# Patient Record
Sex: MALE | Race: WHITE | HISPANIC OR LATINO
[De-identification: names, ages, dates, MRNs, and addresses within clinical notes are randomized per-mention and may not be internally consistent; named-entity substitution may affect disease eponyms.]

---

## 2017-04-26 ENCOUNTER — NON-APPOINTMENT (OUTPATIENT)
Age: 67
End: 2017-04-26

## 2017-04-26 ENCOUNTER — APPOINTMENT (OUTPATIENT)
Dept: CARDIOLOGY | Facility: CLINIC | Age: 67
End: 2017-04-26

## 2017-04-26 VITALS — HEART RATE: 53 BPM | DIASTOLIC BLOOD PRESSURE: 70 MMHG | SYSTOLIC BLOOD PRESSURE: 126 MMHG | OXYGEN SATURATION: 97 %

## 2017-04-26 VITALS — HEART RATE: 53 BPM | SYSTOLIC BLOOD PRESSURE: 116 MMHG | DIASTOLIC BLOOD PRESSURE: 65 MMHG | OXYGEN SATURATION: 97 %

## 2017-04-26 VITALS
BODY MASS INDEX: 27.47 KG/M2 | HEIGHT: 63.5 IN | WEIGHT: 157 LBS | DIASTOLIC BLOOD PRESSURE: 68 MMHG | OXYGEN SATURATION: 97 % | RESPIRATION RATE: 12 BRPM | HEART RATE: 52 BPM | SYSTOLIC BLOOD PRESSURE: 121 MMHG

## 2017-04-26 VITALS — HEART RATE: 56 BPM | DIASTOLIC BLOOD PRESSURE: 69 MMHG | SYSTOLIC BLOOD PRESSURE: 121 MMHG | OXYGEN SATURATION: 96 %

## 2017-04-27 LAB
25(OH)D3 SERPL-MCNC: 36.3 NG/ML
ALBUMIN SERPL ELPH-MCNC: 4.5 G/DL
ALP BLD-CCNC: 57 U/L
ALT SERPL-CCNC: 19 U/L
ANION GAP SERPL CALC-SCNC: 17 MMOL/L
APPEARANCE: CLEAR
AST SERPL-CCNC: 30 U/L
BACTERIA: NEGATIVE
BASOPHILS # BLD AUTO: 0.09 K/UL
BASOPHILS NFR BLD AUTO: 0.9 %
BILIRUB SERPL-MCNC: 0.7 MG/DL
BILIRUBIN URINE: NEGATIVE
BLOOD URINE: NEGATIVE
BUN SERPL-MCNC: 13 MG/DL
CALCIUM SERPL-MCNC: 9.7 MG/DL
CHLORIDE SERPL-SCNC: 94 MMOL/L
CHOLEST SERPL-MCNC: 210 MG/DL
CHOLEST/HDLC SERPL: 2.5 RATIO
CO2 SERPL-SCNC: 29 MMOL/L
COLOR: YELLOW
CREAT SERPL-MCNC: 0.82 MG/DL
CREAT SPEC-SCNC: 74 MG/DL
CRP SERPL HS-MCNC: 1.8 MG/L
EOSINOPHIL # BLD AUTO: 0.17 K/UL
EOSINOPHIL NFR BLD AUTO: 1.7 %
GLUCOSE QUALITATIVE U: NORMAL MG/DL
GLUCOSE SERPL-MCNC: 60 MG/DL
HBA1C MFR BLD HPLC: 5.3 %
HCT VFR BLD CALC: 48.3 %
HDLC SERPL-MCNC: 84 MG/DL
HGB BLD-MCNC: 16.2 G/DL
HYALINE CASTS: 4 /LPF
IMM GRANULOCYTES NFR BLD AUTO: 0.3 %
KETONES URINE: NEGATIVE
LDLC SERPL CALC-MCNC: 103 MG/DL
LEUKOCYTE ESTERASE URINE: NEGATIVE
LYMPHOCYTES # BLD AUTO: 1.86 K/UL
LYMPHOCYTES NFR BLD AUTO: 18.6 %
MAN DIFF?: NORMAL
MCHC RBC-ENTMCNC: 33.5 GM/DL
MCHC RBC-ENTMCNC: 34.7 PG
MCV RBC AUTO: 103.4 FL
MICROALBUMIN 24H UR DL<=1MG/L-MCNC: 0.7 MG/DL
MICROALBUMIN/CREAT 24H UR-RTO: 10 UG/MG
MICROSCOPIC-UA: NORMAL
MONOCYTES # BLD AUTO: 1.43 K/UL
MONOCYTES NFR BLD AUTO: 14.3 %
NEUTROPHILS # BLD AUTO: 6.42 K/UL
NEUTROPHILS NFR BLD AUTO: 64.2 %
NITRITE URINE: NEGATIVE
PH URINE: 7.5
PLATELET # BLD AUTO: 483 K/UL
POTASSIUM SERPL-SCNC: 3.7 MMOL/L
PROT SERPL-MCNC: 7.4 G/DL
PROTEIN URINE: NEGATIVE MG/DL
RBC # BLD: 4.67 M/UL
RBC # FLD: 14 %
RED BLOOD CELLS URINE: 1 /HPF
SODIUM SERPL-SCNC: 140 MMOL/L
SPECIFIC GRAVITY URINE: 1.01
SQUAMOUS EPITHELIAL CELLS: 1 /HPF
TRIGL SERPL-MCNC: 117 MG/DL
TSH SERPL-ACNC: 0.78 UIU/ML
UROBILINOGEN URINE: NORMAL MG/DL
WBC # FLD AUTO: 10 K/UL
WHITE BLOOD CELLS URINE: 0 /HPF

## 2017-10-26 ENCOUNTER — RX RENEWAL (OUTPATIENT)
Age: 67
End: 2017-10-26

## 2017-12-05 ENCOUNTER — APPOINTMENT (OUTPATIENT)
Dept: CARDIOLOGY | Facility: CLINIC | Age: 67
End: 2017-12-05
Payer: COMMERCIAL

## 2017-12-05 VITALS
HEART RATE: 62 BPM | SYSTOLIC BLOOD PRESSURE: 116 MMHG | RESPIRATION RATE: 12 BRPM | BODY MASS INDEX: 26.77 KG/M2 | HEIGHT: 63.5 IN | OXYGEN SATURATION: 98 % | DIASTOLIC BLOOD PRESSURE: 64 MMHG | WEIGHT: 153 LBS

## 2017-12-05 VITALS — DIASTOLIC BLOOD PRESSURE: 61 MMHG | SYSTOLIC BLOOD PRESSURE: 106 MMHG | HEART RATE: 63 BPM | OXYGEN SATURATION: 97 %

## 2017-12-05 VITALS — DIASTOLIC BLOOD PRESSURE: 65 MMHG | HEART RATE: 67 BPM | SYSTOLIC BLOOD PRESSURE: 124 MMHG | OXYGEN SATURATION: 96 %

## 2017-12-05 VITALS — HEART RATE: 62 BPM | OXYGEN SATURATION: 95 % | DIASTOLIC BLOOD PRESSURE: 59 MMHG | SYSTOLIC BLOOD PRESSURE: 105 MMHG

## 2017-12-05 VITALS — HEART RATE: 65 BPM | SYSTOLIC BLOOD PRESSURE: 96 MMHG | DIASTOLIC BLOOD PRESSURE: 55 MMHG | OXYGEN SATURATION: 95 %

## 2017-12-05 PROCEDURE — 93040 RHYTHM ECG WITH REPORT: CPT | Mod: 59

## 2017-12-05 PROCEDURE — 93000 ELECTROCARDIOGRAM COMPLETE: CPT

## 2017-12-05 PROCEDURE — 99215 OFFICE O/P EST HI 40 MIN: CPT

## 2017-12-05 RX ORDER — HYDROCODONE BITARTRATE AND ACETAMINOPHEN 7.5; 325 MG/1; MG/1
7.5-325 TABLET ORAL
Qty: 120 | Refills: 0 | Status: DISCONTINUED | COMMUNITY
Start: 2017-09-15 | End: 2017-12-05

## 2017-12-05 RX ORDER — PREGABALIN 25 MG/1
25 CAPSULE ORAL
Qty: 90 | Refills: 0 | Status: DISCONTINUED | COMMUNITY
Start: 2017-11-03 | End: 2017-12-05

## 2017-12-05 RX ORDER — BRIMONIDINE TARTRATE 2 MG/MG
0.2 SOLUTION/ DROPS OPHTHALMIC
Qty: 15 | Refills: 0 | Status: DISCONTINUED | COMMUNITY
Start: 2017-11-27 | End: 2017-12-05

## 2017-12-05 RX ORDER — CLONIDINE 0.2 MG/24H
0.2 PATCH, EXTENDED RELEASE TRANSDERMAL
Qty: 8 | Refills: 0 | Status: DISCONTINUED | COMMUNITY
Start: 2017-03-06 | End: 2017-12-05

## 2017-12-05 RX ORDER — BRIMONIDINE TARTRATE, TIMOLOL MALEATE 2; 5 MG/ML; MG/ML
0.2-0.5 SOLUTION/ DROPS OPHTHALMIC
Qty: 10 | Refills: 0 | Status: DISCONTINUED | COMMUNITY
Start: 2017-05-18 | End: 2017-12-05

## 2017-12-05 RX ORDER — LEVOFLOXACIN 500 MG/1
500 TABLET, FILM COATED ORAL
Qty: 4 | Refills: 0 | Status: DISCONTINUED | COMMUNITY
Start: 2017-08-14 | End: 2017-12-05

## 2017-12-05 RX ORDER — ESOMEPRAZOLE MAGNESIUM 40 MG/1
40 CAPSULE, DELAYED RELEASE ORAL
Qty: 30 | Refills: 0 | Status: DISCONTINUED | COMMUNITY
Start: 2017-05-26 | End: 2017-12-05

## 2017-12-05 RX ORDER — NALOXEGOL OXALATE 25 MG/1
25 TABLET, FILM COATED ORAL
Qty: 30 | Refills: 0 | Status: DISCONTINUED | COMMUNITY
Start: 2017-06-16 | End: 2017-12-05

## 2018-01-16 ENCOUNTER — RX RENEWAL (OUTPATIENT)
Age: 68
End: 2018-01-16

## 2018-05-23 ENCOUNTER — NON-APPOINTMENT (OUTPATIENT)
Age: 68
End: 2018-05-23

## 2018-05-23 ENCOUNTER — APPOINTMENT (OUTPATIENT)
Dept: CARDIOLOGY | Facility: CLINIC | Age: 68
End: 2018-05-23
Payer: COMMERCIAL

## 2018-05-23 VITALS
OXYGEN SATURATION: 96 % | RESPIRATION RATE: 12 BRPM | HEIGHT: 63 IN | BODY MASS INDEX: 28.88 KG/M2 | HEART RATE: 67 BPM | SYSTOLIC BLOOD PRESSURE: 126 MMHG | WEIGHT: 163 LBS | DIASTOLIC BLOOD PRESSURE: 69 MMHG

## 2018-05-23 VITALS — HEART RATE: 69 BPM | OXYGEN SATURATION: 94 % | SYSTOLIC BLOOD PRESSURE: 131 MMHG | DIASTOLIC BLOOD PRESSURE: 71 MMHG

## 2018-05-23 VITALS — DIASTOLIC BLOOD PRESSURE: 61 MMHG | SYSTOLIC BLOOD PRESSURE: 107 MMHG | OXYGEN SATURATION: 97 % | HEART RATE: 77 BPM

## 2018-05-23 VITALS — OXYGEN SATURATION: 97 % | SYSTOLIC BLOOD PRESSURE: 115 MMHG | DIASTOLIC BLOOD PRESSURE: 65 MMHG | HEART RATE: 78 BPM

## 2018-05-23 VITALS — HEART RATE: 69 BPM | OXYGEN SATURATION: 97 % | DIASTOLIC BLOOD PRESSURE: 69 MMHG | SYSTOLIC BLOOD PRESSURE: 117 MMHG

## 2018-05-23 VITALS — HEART RATE: 76 BPM | SYSTOLIC BLOOD PRESSURE: 114 MMHG | DIASTOLIC BLOOD PRESSURE: 67 MMHG | OXYGEN SATURATION: 96 %

## 2018-05-23 DIAGNOSIS — D50.0 IRON DEFICIENCY ANEMIA SECONDARY TO BLOOD LOSS (CHRONIC): ICD-10-CM

## 2018-05-23 DIAGNOSIS — R09.89 OTHER SPECIFIED SYMPTOMS AND SIGNS INVOLVING THE CIRCULATORY AND RESPIRATORY SYSTEMS: ICD-10-CM

## 2018-05-23 PROCEDURE — 99215 OFFICE O/P EST HI 40 MIN: CPT

## 2018-05-23 PROCEDURE — 36415 COLL VENOUS BLD VENIPUNCTURE: CPT

## 2018-05-23 PROCEDURE — 93040 RHYTHM ECG WITH REPORT: CPT | Mod: 59

## 2018-05-23 PROCEDURE — 93000 ELECTROCARDIOGRAM COMPLETE: CPT

## 2018-05-23 RX ORDER — CLONIDINE HYDROCHLORIDE 0.2 MG/1
0.2 TABLET ORAL
Qty: 60 | Refills: 0 | Status: DISCONTINUED | COMMUNITY
Start: 2017-10-10 | End: 2018-05-23

## 2018-05-23 RX ORDER — FENTANYL 50 UG/1
50 PATCH TRANSDERMAL
Qty: 10 | Refills: 0 | Status: DISCONTINUED | COMMUNITY
Start: 2017-11-15 | End: 2018-05-23

## 2018-05-23 RX ORDER — CYANOCOBALAMIN 1000 UG/ML
1000 INJECTION INTRAMUSCULAR; SUBCUTANEOUS
Qty: 12 | Refills: 0 | Status: DISCONTINUED | COMMUNITY
Start: 2016-11-01 | End: 2018-05-23

## 2018-05-23 RX ORDER — TEMAZEPAM 30 MG/1
30 CAPSULE ORAL
Qty: 60 | Refills: 0 | Status: DISCONTINUED | COMMUNITY
Start: 2017-10-10 | End: 2018-05-23

## 2018-05-24 LAB
25(OH)D3 SERPL-MCNC: 47.3 NG/ML
ALBUMIN SERPL ELPH-MCNC: 4.5 G/DL
ALP BLD-CCNC: 69 U/L
ALT SERPL-CCNC: 26 U/L
ANION GAP SERPL CALC-SCNC: 22 MMOL/L
APPEARANCE: CLEAR
AST SERPL-CCNC: 34 U/L
BACTERIA: NEGATIVE
BASOPHILS # BLD AUTO: 0.04 K/UL
BASOPHILS NFR BLD AUTO: 0.4 %
BILIRUB SERPL-MCNC: 0.4 MG/DL
BILIRUBIN URINE: NEGATIVE
BLOOD URINE: NEGATIVE
BUN SERPL-MCNC: 17 MG/DL
CALCIUM SERPL-MCNC: 9.8 MG/DL
CHLORIDE SERPL-SCNC: 97 MMOL/L
CHOLEST SERPL-MCNC: 200 MG/DL
CHOLEST/HDLC SERPL: 3.3 RATIO
CO2 SERPL-SCNC: 25 MMOL/L
COLOR: ABNORMAL
CREAT SERPL-MCNC: 1.06 MG/DL
CREAT SPEC-SCNC: 241 MG/DL
EOSINOPHIL # BLD AUTO: 0.19 K/UL
EOSINOPHIL NFR BLD AUTO: 2 %
GLUCOSE QUALITATIVE U: NEGATIVE MG/DL
GLUCOSE SERPL-MCNC: 49 MG/DL
HBA1C MFR BLD HPLC: 5 %
HCT VFR BLD CALC: 40.2 %
HDLC SERPL-MCNC: 60 MG/DL
HGB BLD-MCNC: 12.8 G/DL
HYALINE CASTS: 0 /LPF
IMM GRANULOCYTES NFR BLD AUTO: 0.2 %
IRON SATN MFR SERPL: 71 %
IRON SERPL-MCNC: 205 UG/DL
KETONES URINE: ABNORMAL
LDLC SERPL CALC-MCNC: 116 MG/DL
LEUKOCYTE ESTERASE URINE: NEGATIVE
LYMPHOCYTES # BLD AUTO: 1.69 K/UL
LYMPHOCYTES NFR BLD AUTO: 18 %
MAGNESIUM SERPL-MCNC: 1.9 MG/DL
MAN DIFF?: NORMAL
MCHC RBC-ENTMCNC: 31.8 GM/DL
MCHC RBC-ENTMCNC: 33.2 PG
MCV RBC AUTO: 104.4 FL
MICROALBUMIN 24H UR DL<=1MG/L-MCNC: 3.3 MG/DL
MICROALBUMIN/CREAT 24H UR-RTO: 14 MG/G
MICROSCOPIC-UA: NORMAL
MONOCYTES # BLD AUTO: 1.01 K/UL
MONOCYTES NFR BLD AUTO: 10.8 %
NEUTROPHILS # BLD AUTO: 6.43 K/UL
NEUTROPHILS NFR BLD AUTO: 68.6 %
NITRITE URINE: NEGATIVE
PH URINE: 5
PLATELET # BLD AUTO: 365 K/UL
POTASSIUM SERPL-SCNC: 3.6 MMOL/L
PROT SERPL-MCNC: 7.2 G/DL
PROTEIN URINE: NEGATIVE MG/DL
RBC # BLD: 3.85 M/UL
RBC # FLD: 18.4 %
RED BLOOD CELLS URINE: 2 /HPF
SODIUM SERPL-SCNC: 144 MMOL/L
SPECIFIC GRAVITY URINE: 1.02
SQUAMOUS EPITHELIAL CELLS: 1 /HPF
TIBC SERPL-MCNC: 290 UG/DL
TRIGL SERPL-MCNC: 122 MG/DL
UIBC SERPL-MCNC: 85 UG/DL
UROBILINOGEN URINE: NEGATIVE MG/DL
WBC # FLD AUTO: 9.38 K/UL
WHITE BLOOD CELLS URINE: 2 /HPF

## 2018-05-27 ENCOUNTER — NON-APPOINTMENT (OUTPATIENT)
Age: 68
End: 2018-05-27

## 2018-10-17 ENCOUNTER — APPOINTMENT (OUTPATIENT)
Dept: PULMONOLOGY | Facility: CLINIC | Age: 68
End: 2018-10-17
Payer: COMMERCIAL

## 2018-10-17 VITALS
BODY MASS INDEX: 29.77 KG/M2 | RESPIRATION RATE: 12 BRPM | WEIGHT: 168 LBS | TEMPERATURE: 99 F | HEART RATE: 77 BPM | DIASTOLIC BLOOD PRESSURE: 74 MMHG | SYSTOLIC BLOOD PRESSURE: 120 MMHG | OXYGEN SATURATION: 97 % | HEIGHT: 63 IN

## 2018-10-17 PROCEDURE — 99214 OFFICE O/P EST MOD 30 MIN: CPT

## 2018-10-18 ENCOUNTER — OTHER (OUTPATIENT)
Age: 68
End: 2018-10-18

## 2018-10-21 ENCOUNTER — RX RENEWAL (OUTPATIENT)
Age: 68
End: 2018-10-21

## 2018-10-25 ENCOUNTER — APPOINTMENT (OUTPATIENT)
Dept: SLEEP CENTER | Facility: HOSPITAL | Age: 68
End: 2018-10-25

## 2018-11-07 ENCOUNTER — APPOINTMENT (OUTPATIENT)
Dept: SLEEP CENTER | Facility: HOME HEALTH | Age: 68
End: 2018-11-07
Payer: COMMERCIAL

## 2018-11-07 ENCOUNTER — OUTPATIENT (OUTPATIENT)
Dept: OUTPATIENT SERVICES | Facility: HOSPITAL | Age: 68
LOS: 1 days | End: 2018-11-07
Payer: COMMERCIAL

## 2018-11-07 PROCEDURE — G0400: CPT

## 2018-11-07 PROCEDURE — 95800 SLP STDY UNATTENDED: CPT

## 2018-11-14 DIAGNOSIS — G47.33 OBSTRUCTIVE SLEEP APNEA (ADULT) (PEDIATRIC): ICD-10-CM

## 2018-11-28 ENCOUNTER — APPOINTMENT (OUTPATIENT)
Dept: CARDIOLOGY | Facility: CLINIC | Age: 68
End: 2018-11-28
Payer: COMMERCIAL

## 2018-11-28 ENCOUNTER — NON-APPOINTMENT (OUTPATIENT)
Age: 68
End: 2018-11-28

## 2018-11-28 VITALS — DIASTOLIC BLOOD PRESSURE: 69 MMHG | HEART RATE: 62 BPM | OXYGEN SATURATION: 95 % | SYSTOLIC BLOOD PRESSURE: 128 MMHG

## 2018-11-28 VITALS — SYSTOLIC BLOOD PRESSURE: 131 MMHG | OXYGEN SATURATION: 97 % | DIASTOLIC BLOOD PRESSURE: 66 MMHG | HEART RATE: 70 BPM

## 2018-11-28 VITALS — HEART RATE: 68 BPM | SYSTOLIC BLOOD PRESSURE: 131 MMHG | OXYGEN SATURATION: 95 % | DIASTOLIC BLOOD PRESSURE: 68 MMHG

## 2018-11-28 VITALS
OXYGEN SATURATION: 96 % | DIASTOLIC BLOOD PRESSURE: 66 MMHG | HEIGHT: 63 IN | RESPIRATION RATE: 12 BRPM | BODY MASS INDEX: 29.95 KG/M2 | WEIGHT: 169 LBS | HEART RATE: 60 BPM | SYSTOLIC BLOOD PRESSURE: 122 MMHG

## 2018-11-28 VITALS — HEART RATE: 63 BPM | SYSTOLIC BLOOD PRESSURE: 132 MMHG | OXYGEN SATURATION: 95 % | DIASTOLIC BLOOD PRESSURE: 69 MMHG

## 2018-11-28 DIAGNOSIS — G25.81 RESTLESS LEGS SYNDROME: ICD-10-CM

## 2018-11-28 DIAGNOSIS — Z87.891 PERSONAL HISTORY OF NICOTINE DEPENDENCE: ICD-10-CM

## 2018-11-28 PROCEDURE — 93000 ELECTROCARDIOGRAM COMPLETE: CPT

## 2018-11-28 PROCEDURE — 99215 OFFICE O/P EST HI 40 MIN: CPT

## 2018-11-28 PROCEDURE — 93040 RHYTHM ECG WITH REPORT: CPT | Mod: 59

## 2018-11-28 RX ORDER — NALOXONE HYDROCHLORIDE 1 MG/ML
2 INJECTION PARENTERAL
Qty: 4 | Refills: 0 | Status: DISCONTINUED | COMMUNITY
Start: 2017-10-13 | End: 2018-11-28

## 2018-11-28 RX ORDER — TIZANIDINE 2 MG/1
2 TABLET ORAL
Qty: 90 | Refills: 0 | Status: DISCONTINUED | COMMUNITY
Start: 2017-02-06 | End: 2018-11-28

## 2018-11-28 NOTE — HISTORY OF PRESENT ILLNESS
[FreeTextEntry1] : Mr. Gomez was initially seen by me for elevated blood pressure in 1999 (at [facility]) in the setting of painful osteoarthritis, degenerative spinal stenosis, seasonal allergies with chronic recurrent sinusitis and peptic ulcer disease / gastritis, presumably due to chronic use of NSAIDs. He was on FERNANDO-2 inhibitors as well. At the time of office assessment on 9/20/10, he complained of chronic fatigue, neck and back pain (particularly lower back). He was no longer taking clonidine, for blood pressure and was on stable dose of milnacipran (Savella) for fibromyalgia. There was insomnia and history of heavy snoring. He used temazepam (Restoril) intermittently. Self-obtained SBP was 120-135 mm Hg and DBP  80-85 mm Hg. Pulse was frequently between 100-115 bpm. His main complaint at the time of office visit on 9/28/11 was diaphoresis. At the time of office visit on 1/7/12, self obtained BP was mildly elevated. On 10/11/13,  noted very mild right sided carotid bruit. At the time of office visit on 11/15/13, there was intermittent chest pain and shortness of breath, though not always associated with exertion. Physical exertion (eg walking) was limited by arthritic pain. In June 2014, he was diagnosed with central sleep apnea. He started CPAP. He noted more energy for awhile, but soon afterwards, there was recurrence of fatigue. He used Provigil. He complained of constant diaphoresis and frequent headaches. There was no chest pain, shortness of breath or lightheadedness at time of assessment. His main complaints were chronic diaphoresis and fast pulse. At the time of office visit on 4/22/15, his main complaints were arthritic back pain, hip pain, and shoulder pain. He was concerned about weight gain. He noted persistent daytime fatigue, with insomnia at night. At the time of office visit on 10/23/15, his main complaint was persistent sinus congestion. He was not using CPAP daily, due to sense of claustrophobia. He used samples of indomethacin and allopurinol for painful joints, which were thought to be gout (though not assessed). He adjusted diet and lost weight since April 2015 visit. At the time of office visit on 4/22/16, there were no new symptoms. There were no recent episodes of chest pain. He underwent pulmonary re-assessment on 10/7/16 and was fitted for new CPAP for ROSALIE.  During office visit on 10/21/16, his chief complaint was joint pains. At the time of office visit on 4/26/17, his main complaint was back pain. He tolerated his regimen. On 2/6/18, he underwent spinal surgery (minimally invasive posterior lumbar fusion surgery of L5 - S1) with Dr. Reinaldo Downs at [facility].  At the time of office visit on 5/25/18, he felt well. He was off most pain medication since undergoing surgery. Medications were reconciled. Mr. Gomez was not using fish oil. There was no longer dry mouth since decreasing dose of clonidine. At the time of office visit on 11/28/18, there were no new symptoms. He complained of bilateral shoulder pain, R>L.

## 2018-11-28 NOTE — REASON FOR VISIT
[Follow-Up - Clinic] : a clinic follow-up of [Chest Pain] : chest pain [Hyperlipidemia] : hyperlipidemia [Hypertension] : hypertension [Medication Management] : Medication management [Spouse] : spouse

## 2018-11-28 NOTE — ADDENDUM
[FreeTextEntry1] : I spent 60 min face to face time with the patient from 12:00 to 13:00 on 11/28/18. Thirty minutes were devoted to  patient counseling at outlined above in the End of Visit section. Additional time was spent reconciling medication list with the patient. Labs done 10/23/18 were reviewed. I reviewed office notes of Dr. Hawthorne (Pul - Sleep medicine).

## 2018-11-28 NOTE — DISCUSSION/SUMMARY
[Patient] : the patient [Minutes spent___] : for [unfilled] ~Uminutes [___ Month(s)] : [unfilled] month(s) [With Me] : with me [FreeTextEntry1] : \par WEIGHT GOALS:\par \par Category				BMI range - kg/m2	BMI Prime\par Very severely underweight		less than 15		less than 0.60	\par Severely underweight			from 15.0 to 16.0		from 0.60 to 0.64	\par Underweight				from 16.0 to 18.5		from 0.64 to 0.74	\par Normal (healthy weight)			from 18.5 to 25		from 0.74 to 1.0	\par Overweight				from 25 to 30		from 1.0 to 1.2	\par Obese Class I (Moderately obese)		from 30 to 35		from 1.2 to 1.4	\par Obese Class II (Severely obese)		from 35 to 40		from 1.4 to 1.6	\par Obese Class III (Very severely obese)	over 40			over 1.6	\par \par Your current weight is 169 lbs (163 lbs on 5/23/18). Given current weight and height 5'3", your calculated body mass index (BMI) is 29.9 kg/sqm. Normal BMI is 18.5-25 kg/sqm. Thus current weight is in the overweight category. Abdominal waist circumference is measured at the level of the umbilicus and is thus not a pants waist measurement. Your current abdominal waist circumference is 41 in (39.5 in on 5/23/18). Normal abdominal waist circumference is < 32 inches for women and < 37 inches for men\par \par DIETARY SALT (SODIUM); DASH DIET AND BLOOD PRESSURE:\par To decrease the sodium in your diet: \par · Use fresh vegetables and foods as much as possible.\par · Avoid canned and processed foods. Cured meats such as rodriguez, ham, and sausages are high in salt.\par · Try using different herbs and spices in your cooking instead of salt.\par · In restaurants, avoid foods with sauces, cheese, and cured meats. Ask for low-sodium choices.\par To get more potassium in your diet, eat:\par · Bananas, fresh or dried apricots, peaches, citrus fruits, melons\par · Cauliflower, broccoli, tomatoes, carrots, raw spinach, beet greens, potatoes\par To get more magnesium in your diet, eat:\par · Whole grain foods, leafy green vegetables, dried fruits\par • Fish and seafood, poultry \par To get more calcium in your diet, eat:\par · Nonfat milk, yogurt, and low-fat cheeses \par · Dover and sardines\par · Cooked dried beans\par · Broccoli, kale, and bok filiberto\par · Tofu or soybean curd\par DASH stands for "dietary approaches to stop hypertension." The DASH diet is low in total and saturated fat. It is rich in fruits, vegetables, and low-fat dairy foods. The diet allows you to get natural fiber, calcium, and magnesium from food. It prevents or lowers high blood pressure. It can also help lower cholesterol in your blood. \par Don't change how you eat all at once. It's much more likely that you'll succeed if you make only one or two small changes at a time. Wait until those changes are a habit, then make a couple more changes. Some good starting steps include: \par Add one serving of vegetables to your meals at lunch and dinner. This is an easy way to help you get more vegetables in your diet. \par Have a piece of fruit as an afternoon or after-dinner snack. One glass of juice at breakfast is not enough fruit in your diet. \par Use half your usual amount of butter, margarine, or salad dressing. \par Buy nonfat salad dressing or nonfat sour cream.\par Follow this guide to select your menu of meals. The number of calories we want you to eat each day will tell you how many servings you can choose from each food group.\par Calories: 1600 2100 2600 3100  Servings Grains 6 7 ½ 10 ½ 12 ½  Vegetables 	 4 4 ½ 5 6 Fruit 4 5 5 6 Dairy (low-fat) 2 ½ 3 3 3 ½  Meat, poultry, fish ½ 1 ½ 2 2 ½  Nuts, seeds ½ ½ ½ 1 Fats and sweets 1 ½ 2 ½ 3 4\par Grains and grain products like breads and cereals provide energy, fiber and vitamins. Whole grains have more of these nutrients. One serving equals one of the following:\par Bagel, 1/2 medium; Barley, cooked 1/2 cup; Biscuit, country style 1 medium; Bread, whole wheat, white 1 slice; Cereals, cold or cooked, 1/2 cup; Cornbread, 1 medium piece; Crackers, alfredo, 2; Crackers, saltine, 4; Dinner roll, 1medium; English muffin, ½; Hamburger bun, ½; Muffin, 1 medium; Pancake, 1 medium; Pasta, 1/2 cup cooked; Liv, 1/2 large or 1 small; Popcorn, 1 cup popped; Pretzels, 1 ounce; Rice, white, brown, or wild, 1/2 cup cooked; Tortillas, corn or flour, 1 medium; Waffle, 1 medium; Wheat germ, 1/4 cup; \par Vegetables are rich sources of potassium, magnesium, and fiber. One serving is 1/2 cup of any of the following cooked vegetables:\par Asparagus, Beans (green, yellow), Beets, Broccoli, [place] Sprouts, Carrots, Cauliflower, Fernando, chicory, mustard and turnip (and other) greens, Corn, Kale, Lima beans, Mixed vegetables, Okra, Parsnips, Peas, green, Potatoes (1/2 medium or 1/2 cup mashed), Pumpkin, Rutabaga, Spaghetti or tomato sauce, Spinach, Squash (zucchini or yellow), Stewed tomatoes, Succotash, Sweet potatoes, Turnips, Yam \par Raw vegetables: Carrots,1/2 cup; Celery, 1/2 cup; Lettuce (edwar, loose-leaf, green-leaf), 1 cup; Peppers, 1/2 cup; Spinach, 1 cup; Tomato, 1/2\par Fruits and fruit juices are important sources of potassium and magnesium. Fruits also contain fiber and are low in sodium and fat. One serving equals:\par Any fruit juice, # cup (6 ounces); Canned or frozen fruit, ½ cup (includes applesauce); Dried fruit, ¼ cup; \par Fresh fruit:\par Apple, 1 medium; Apricots, 2 medium; Banana, 1 medium; Berries, 1/2 cup; Melon, 1 wedge, or 1/2 cup; Cherries, 10; Grapefruit, 1/2; Grapes, 15; Kiwi, 1 medium; Dereck, 1/2 small; Nectarine, 1 medium; Orange, 1 medium; Peach, 1 medium; Pear, 1 medium; Pineapple, 1/2 cup; Plums, 2 medium; Tangerine, 1 large\par Dairy foods provide protein and calcium. Use low-fat or nonfat dairy products to cut down on fat. One serving equals:\par Skim milk, 1 cup (8 ounces); 1% low fat milk, 1 cup (8 ounces); 2% low fat milk, 1 cup (8 ounces) nonfat dry milk powder (1/3 cup); Low-fat cottage cheese, 1 cup (8 ounces); Parmesan cheese, 1 tablespoon; Mozzarella cheese, part skim, 1/4 cup (1 ounce); Low-fat cheddar cheese, 11/2 ounces; Ricotta cheese, part skim milk or nonfat, 1/4 cup (11/2 ounces); Other low fat or nonfat cheeses (11/2 oz.); Low-fat or nonfat yogurt, fruit-flavored or plain, 1 cup (8 ounces)\par Low-fat or nonfat frozen yogurt, 1/2 cup (4 ounces); Note: People who can't digest dairy products can try taking lactase enzyme pills or drops (available at drug and grocery stores) when they eat dairy. There is also milk available with the enzyme already added. Or you can buy lactose-free milk.\par Meat, poultry, and fish are good sources of protein and magnesium. One serving equals:\par Lean meat including beef, veal, or pork, 3 ounces cooked; Skinless, white meat poultry including turkey, chicken, 3 ounces; Fish and shellfish, 3 ounces cooked; Low-fat luncheon meats, 1 ounce; Egg, 1 medium; Tofu, 3 ounces\par Note: Three ounces of cooked meat is about the size of a deck of cards.\par Nuts, seeds, and legumes are rich sources of energy, magnesium, potassium, protein and fiber. Nuts and seeds are also high in fat, so portions should be small.\par Almonds, 1/3 cup; Beans such as kidney, grossman, and navy, 1/2 cup cooked; Chickpeas and lentils, 1/2 cup cooked; Cashews, 1/3 cup; Filberts, 1/3 cup; Mixed nuts, 1/3 cup; Peanut butter, 2 tablespoons; Peanuts, 1/3 cup; Sesame seeds, 2 tablespoons; Sunflower seeds, 2 tablespoons; Tofu, regular, 3 ounces; Walnuts, 1/3 cup \par Following the above diet will give you about 27% fat in your diet. The goal is to have 30% or less of the calories you eat each day be from fat. To meet that goal, do not eat more than 2-3 servings daily of added fat. Also try to limit sweets. One serving equals:\par Butter or margarine, 1 teaspoon; Mayonnaise, 1 teaspoon; Low-fat mayonnaise, 1 tablespoon; Salad dressing, 1 tablespoon; Low-fat salad dressing, 2 tablespoons; Oil, 1 teaspoon (use olive, canola, safflower, or other vegetable oils); Candy, hard, 3 pieces; Jelly or jam, 1 tablespoon); Jell-O, 1/2 cup; Jelly beans, 1/2 ounce; Maple syrup, 1 tablespoon; Popsicle, 1; Sherbet or nonfat or low-fat frozen yogurt, 1/2 cup; Sugar, 1 tablespoon; Sugared lemonade or fruit punch, 1 cup (8 ounces); Note: Try diet fruit-flavored gelatin or frozen, canned, or fresh fruit for dessert.\par \par Small amounts of alcohol may have benefits to the heart and blood pressure. However, excess use of alcohol can cause damage to the brain, liver and other organs. It can lead to high blood pressure. Drinking more than two drinks (15 ml)  every day can raise your blood pressure. 15 ml of alcohol equals: \par • one 12-ounce bottle of beer \par • a half glass (5 ounces) of wine \par • 1 ounce (one shot) of 100 proof hard liquor\par

## 2018-11-28 NOTE — PHYSICAL EXAM
[General Appearance - Well Developed] : well developed [Normal Appearance] : normal appearance [Well Groomed] : well groomed [General Appearance - Well Nourished] : well nourished [No Deformities] : no deformities [General Appearance - In No Acute Distress] : no acute distress [Normal Conjunctiva] : the conjunctiva exhibited no abnormalities [Eyelids - No Xanthelasma] : the eyelids demonstrated no xanthelasmas [Normal Oral Mucosa] : normal oral mucosa [No Oral Pallor] : no oral pallor [No Oral Cyanosis] : no oral cyanosis [Normal Oropharynx] : normal oropharynx [Normal Jugular Venous A Waves Present] : normal jugular venous A waves present [Normal Jugular Venous V Waves Present] : normal jugular venous V waves present [No Jugular Venous Vargas A Waves] : no jugular venous vargas A waves [Respiration, Rhythm And Depth] : normal respiratory rhythm and effort [Exaggerated Use Of Accessory Muscles For Inspiration] : no accessory muscle use [Auscultation Breath Sounds / Voice Sounds] : lungs were clear to auscultation bilaterally [Abdomen Soft] : soft [Abdomen Tenderness] : non-tender [Abdomen Mass (___ Cm)] : no abdominal mass palpated [Limping On The Right] : limping on the right [Limping On The Left] : limping on the left [Gait - Steppage Right] : a steppage gait was noted on the right [Gait - Steppage Left] : a steppage gait was noted on the left [Nail Clubbing] : no clubbing of the fingernails [Cyanosis, Localized] : no localized cyanosis [Petechial Hemorrhages (___cm)] : no petechial hemorrhages [Skin Color & Pigmentation] : normal skin color and pigmentation [No Venous Stasis] : no venous stasis [Skin Lesions] : no skin lesions [No Skin Ulcers] : no skin ulcer [No Xanthoma] : no  xanthoma was observed [Oriented To Time, Place, And Person] : oriented to person, place, and time [Affect] : the affect was normal [Mood] : the mood was normal [No Anxiety] : not feeling anxious [5th Left ICS - MCL] : palpated at the 5th LICS in the midclavicular line [Normal] : normal [No Precordial Heave] : no precordial heave was noted [Normal Rate] : normal [Rhythm Regular] : regular [Normal S1] : normal S1 [Normal S2] : normal S2 [No Gallop] : no gallop heard [No Murmur] : no murmurs heard [I] : a grade 1 [] : is unchanged with valsalva [2+] : left 2+ [No Abnormalities] : the abdominal aorta was not enlarged and no bruit was heard [No Pitting Edema] : no pitting edema present [Prolonged Exp Time] : with normal expiratory time [Increased E/I Ratio] : with normal expiratory/inspiratory ratio  [FreeTextEntry1] : ankle circumference is 8 inches on the right and 8` inches on the left (previously 7.5 in) [Apical Thrill] : no thrill palpable at the apex [S3] : no S3 [S4] : no S4 [Click] : no click [Pericardial Rub] : no pericardial rub [Right Carotid Bruit] : no bruit heard over the right carotid [Left Carotid Bruit] : no bruit heard over the left carotid [Right Femoral Bruit] : no bruit heard over the right femoral artery [Left Femoral Bruit] : no bruit heard over the left femoral artery [Rt] : no varicose veins of the right leg [Lt] : no varicose veins of the left leg

## 2018-12-27 ENCOUNTER — RX RENEWAL (OUTPATIENT)
Age: 68
End: 2018-12-27

## 2019-01-02 ENCOUNTER — APPOINTMENT (OUTPATIENT)
Dept: PULMONOLOGY | Facility: CLINIC | Age: 69
End: 2019-01-02
Payer: COMMERCIAL

## 2019-01-02 VITALS
DIASTOLIC BLOOD PRESSURE: 90 MMHG | TEMPERATURE: 99 F | SYSTOLIC BLOOD PRESSURE: 120 MMHG | OXYGEN SATURATION: 97 % | HEART RATE: 85 BPM | HEIGHT: 63 IN | RESPIRATION RATE: 12 BRPM | BODY MASS INDEX: 30.65 KG/M2 | WEIGHT: 173 LBS

## 2019-01-02 PROCEDURE — 99214 OFFICE O/P EST MOD 30 MIN: CPT

## 2019-03-11 ENCOUNTER — APPOINTMENT (OUTPATIENT)
Dept: PULMONOLOGY | Facility: CLINIC | Age: 69
End: 2019-03-11

## 2019-05-29 ENCOUNTER — NON-APPOINTMENT (OUTPATIENT)
Age: 69
End: 2019-05-29

## 2019-05-29 ENCOUNTER — APPOINTMENT (OUTPATIENT)
Dept: CARDIOLOGY | Facility: CLINIC | Age: 69
End: 2019-05-29
Payer: COMMERCIAL

## 2019-05-29 VITALS — DIASTOLIC BLOOD PRESSURE: 67 MMHG | SYSTOLIC BLOOD PRESSURE: 123 MMHG | HEART RATE: 69 BPM | OXYGEN SATURATION: 97 %

## 2019-05-29 VITALS — DIASTOLIC BLOOD PRESSURE: 84 MMHG | HEART RATE: 65 BPM | SYSTOLIC BLOOD PRESSURE: 138 MMHG | OXYGEN SATURATION: 95 %

## 2019-05-29 VITALS — OXYGEN SATURATION: 97 % | DIASTOLIC BLOOD PRESSURE: 62 MMHG | SYSTOLIC BLOOD PRESSURE: 117 MMHG | HEART RATE: 66 BPM

## 2019-05-29 VITALS — SYSTOLIC BLOOD PRESSURE: 120 MMHG | OXYGEN SATURATION: 97 % | DIASTOLIC BLOOD PRESSURE: 65 MMHG | HEART RATE: 72 BPM

## 2019-05-29 VITALS — SYSTOLIC BLOOD PRESSURE: 120 MMHG | OXYGEN SATURATION: 96 % | DIASTOLIC BLOOD PRESSURE: 74 MMHG | HEART RATE: 65 BPM

## 2019-05-29 VITALS
DIASTOLIC BLOOD PRESSURE: 82 MMHG | SYSTOLIC BLOOD PRESSURE: 148 MMHG | OXYGEN SATURATION: 97 % | HEART RATE: 63 BPM | BODY MASS INDEX: 29.77 KG/M2 | HEIGHT: 63 IN | RESPIRATION RATE: 12 BRPM | WEIGHT: 168 LBS

## 2019-05-29 PROCEDURE — 93040 RHYTHM ECG WITH REPORT: CPT | Mod: 59

## 2019-05-29 PROCEDURE — 93000 ELECTROCARDIOGRAM COMPLETE: CPT

## 2019-05-29 PROCEDURE — 99215 OFFICE O/P EST HI 40 MIN: CPT

## 2019-05-29 PROCEDURE — 36415 COLL VENOUS BLD VENIPUNCTURE: CPT

## 2019-05-30 LAB
25(OH)D3 SERPL-MCNC: 28.4 NG/ML
ALBUMIN SERPL ELPH-MCNC: 4.9 G/DL
ALP BLD-CCNC: 74 U/L
ALT SERPL-CCNC: 23 U/L
ANION GAP SERPL CALC-SCNC: 17 MMOL/L
APPEARANCE: CLEAR
AST SERPL-CCNC: 29 U/L
BACTERIA: NEGATIVE
BASOPHILS # BLD AUTO: 0.09 K/UL
BASOPHILS NFR BLD AUTO: 1.2 %
BILIRUB SERPL-MCNC: 0.5 MG/DL
BILIRUBIN URINE: NEGATIVE
BLOOD URINE: NEGATIVE
BUN SERPL-MCNC: 20 MG/DL
CALCIUM SERPL-MCNC: 9.9 MG/DL
CHLORIDE SERPL-SCNC: 93 MMOL/L
CHOLEST SERPL-MCNC: 211 MG/DL
CHOLEST/HDLC SERPL: 3.1 RATIO
CK SERPL-CCNC: 231 U/L
CO2 SERPL-SCNC: 25 MMOL/L
COLOR: ABNORMAL
CREAT SERPL-MCNC: 0.95 MG/DL
CREAT SPEC-SCNC: 223 MG/DL
CRP SERPL HS-MCNC: 2.2 MG/L
EOSINOPHIL # BLD AUTO: 0.09 K/UL
EOSINOPHIL NFR BLD AUTO: 1.2 %
ESTIMATED AVERAGE GLUCOSE: 114 MG/DL
GLUCOSE QUALITATIVE U: NEGATIVE
GLUCOSE SERPL-MCNC: 77 MG/DL
HBA1C MFR BLD HPLC: 5.6 %
HCT VFR BLD CALC: 44.9 %
HDLC SERPL-MCNC: 69 MG/DL
HGB BLD-MCNC: 14.8 G/DL
HYALINE CASTS: 0 /LPF
IMM GRANULOCYTES NFR BLD AUTO: 0.3 %
KETONES URINE: NEGATIVE
LDLC SERPL CALC-MCNC: 120 MG/DL
LEUKOCYTE ESTERASE URINE: NEGATIVE
LYMPHOCYTES # BLD AUTO: 1.48 K/UL
LYMPHOCYTES NFR BLD AUTO: 19.9 %
MAN DIFF?: NORMAL
MCHC RBC-ENTMCNC: 31 PG
MCHC RBC-ENTMCNC: 33 GM/DL
MCV RBC AUTO: 94.1 FL
MICROALBUMIN 24H UR DL<=1MG/L-MCNC: 5.2 MG/DL
MICROALBUMIN/CREAT 24H UR-RTO: 23 MG/G
MICROSCOPIC-UA: NORMAL
MONOCYTES # BLD AUTO: 1.02 K/UL
MONOCYTES NFR BLD AUTO: 13.7 %
NEUTROPHILS # BLD AUTO: 4.73 K/UL
NEUTROPHILS NFR BLD AUTO: 63.7 %
NITRITE URINE: NEGATIVE
PH URINE: 6
PLATELET # BLD AUTO: 383 K/UL
POTASSIUM SERPL-SCNC: 4.1 MMOL/L
PROT SERPL-MCNC: 7.6 G/DL
PROTEIN URINE: NORMAL
RBC # BLD: 4.77 M/UL
RBC # FLD: 14.9 %
RED BLOOD CELLS URINE: 3 /HPF
SODIUM SERPL-SCNC: 135 MMOL/L
SPECIFIC GRAVITY URINE: 1.02
SQUAMOUS EPITHELIAL CELLS: 1 /HPF
T4 FREE SERPL-MCNC: 1.5 NG/DL
TRIGL SERPL-MCNC: 109 MG/DL
TSH SERPL-ACNC: 0.97 UIU/ML
UROBILINOGEN URINE: NORMAL
WBC # FLD AUTO: 7.43 K/UL
WHITE BLOOD CELLS URINE: 0 /HPF

## 2019-05-30 NOTE — HISTORY OF PRESENT ILLNESS
[FreeTextEntry1] : Mr. Gomez was initially seen by me for elevated and labile BP 1999 in the setting of painful osteoarthritis, degenerative spinal stenosis, seasonal allergies with chronic recurrent sinusitis and peptic ulcer disease / gastritis, presumably due to chronic use of NSAIDs. He was on FERNANDO-2 inhibitors as well. At the time of office assessment on 9/20/10, he complained of chronic fatigue, neck and back pain (particularly lower back). He was no longer taking clonidine, for BP and was on stable dose of milnacipran (Savella) for fibromyalgia. There was insomnia and history of heavy snoring. He used temazepam (Restoril) intermittently. Self-obtained SBP was 120-135 mm Hg and DBP  80-85 mm Hg. Pulse was frequently between 100-115 bpm. On 10/11/13, a mild right sided carotid bruit was noted. At the time of office visit on 11/15/13, there was intermittent chest pain and shortness of breath, though not always associated with exertion. Physical exertion (eg walking) was limited by arthritic pain. In June 2014, he was diagnosed with central sleep apnea. He started CPAP. He noted more energy for awhile, but soon afterwards, there was recurrence of fatigue. He used Provigil. He complained of constant diaphoresis and frequent headaches. There was no chest pain, shortness of breath or lightheadedness at time of assessment. At the time of office visit on 10/23/15, he was not using CPAP daily, due to sense of claustrophobia. He used samples of indomethacin and allopurinol for painful joints, which were thought to be gout (though not assessed). He adjusted diet and lost weight since April 2015 visit. He underwent pulmonary re-assessment on 10/7/16 and was fitted for new CPAP for ROSALIE.  On 2/6/18, he underwent spinal surgery (minimally invasive posterior lumbar fusion surgery of L5 - S1) with Dr. Reinaldo Downs at [facility].  At the time of office visit on 5/25/18, he felt well. He was off most pain medication since undergoing surgery. Medications were reconciled. Mr. Gomez was not using fish oil. There was no longer dry mouth since decreasing dose of clonidine. At the time of office visit on 5/29/19, he started THC for pain management under direction of Dr. Epi Ribera, physical medicine and rehab at [facility]. He was recently prescribed atorvastatin 10 mg daily, but stopped after two weeks because of nausea

## 2019-05-30 NOTE — DISCUSSION/SUMMARY
[Patient] : the patient [Minutes spent___] : for [unfilled] ~Uminutes [___ Month(s)] : [unfilled] month(s) [With Me] : with me [FreeTextEntry1] : \par WEIGHT GOALS:\par \par Category				BMI range - kg/m2	BMI Prime\par Very severely underweight		less than 15		less than 0.60	\par Severely underweight			from 15.0 to 16.0		from 0.60 to 0.64	\par Underweight				from 16.0 to 18.5		from 0.64 to 0.74	\par Normal (healthy weight)			from 18.5 to 25		from 0.74 to 1.0	\par Overweight				from 25 to 30		from 1.0 to 1.2	\par Obese Class I (Moderately obese)		from 30 to 35		from 1.2 to 1.4	\par Obese Class II (Severely obese)		from 35 to 40		from 1.4 to 1.6	\par Obese Class III (Very severely obese)	over 40			over 1.6	\par \par Your current weight is 168 lbs (163 lbs on 5/23/18). Given current weight and height 5'3", your calculated body mass index (BMI) is 29.8 kg/sqm. Normal BMI is 18.5-25 kg/sqm. Thus current weight is in the overweight category. Abdominal waist circumference is measured at the level of the umbilicus and is thus not a pants waist measurement. Your current abdominal waist circumference is 41 in (39.5 in on 5/23/18). Normal abdominal waist circumference is < 32 inches for women and < 37 inches for men\par \par DIETARY SALT (SODIUM); DASH DIET AND BLOOD PRESSURE:\par To decrease the sodium in your diet: \par · Use fresh vegetables and foods as much as possible.\par · Avoid canned and processed foods. Cured meats such as rodriguez, ham, and sausages are high in salt.\par · Try using different herbs and spices in your cooking instead of salt.\par · In restaurants, avoid foods with sauces, cheese, and cured meats. Ask for low-sodium choices.\par To get more potassium in your diet, eat:\par · Bananas, fresh or dried apricots, peaches, citrus fruits, melons\par · Cauliflower, broccoli, tomatoes, carrots, raw spinach, beet greens, potatoes\par To get more magnesium in your diet, eat:\par · Whole grain foods, leafy green vegetables, dried fruits\par • Fish and seafood, poultry \par To get more calcium in your diet, eat:\par · Nonfat milk, yogurt, and low-fat cheeses \par · [place] and sardines\par · Cooked dried beans\par · Broccoli, kale, and bok filiberto\par · Tofu or soybean curd\par DASH stands for "dietary approaches to stop hypertension." The DASH diet is low in total and saturated fat. It is rich in fruits, vegetables, and low-fat dairy foods. The diet allows you to get natural fiber, calcium, and magnesium from food. It prevents or lowers high blood pressure. It can also help lower cholesterol in your blood. \par Don't change how you eat all at once. It's much more likely that you'll succeed if you make only one or two small changes at a time. Wait until those changes are a habit, then make a couple more changes. Some good starting steps include: \par Add one serving of vegetables to your meals at lunch and dinner. This is an easy way to help you get more vegetables in your diet. \par Have a piece of fruit as an afternoon or after-dinner snack. One glass of juice at breakfast is not enough fruit in your diet. \par Use half your usual amount of butter, margarine, or salad dressing. \par Buy nonfat salad dressing or nonfat sour cream.\par Follow this guide to select your menu of meals. The number of calories we want you to eat each day will tell you how many servings you can choose from each food group.\par Calories: 1600 2100 2600 3100  Servings Grains 6 7 ½ 10 ½ 12 ½  Vegetables 	 4 4 ½ 5 6 Fruit 4 5 5 6 Dairy (low-fat) 2 ½ 3 3 3 ½  Meat, poultry, fish ½ 1 ½ 2 2 ½  Nuts, seeds ½ ½ ½ 1 Fats and sweets 1 ½ 2 ½ 3 4\par Grains and grain products like breads and cereals provide energy, fiber and vitamins. Whole grains have more of these nutrients. One serving equals one of the following:\par Bagel, 1/2 medium; Barley, cooked 1/2 cup; Biscuit, country style 1 medium; Bread, whole wheat, white 1 slice; Cereals, cold or cooked, 1/2 cup; Cornbread, 1 medium piece; Crackers, alfredo, 2; Crackers, saltine, 4; Dinner roll, 1medium; English muffin, ½; Hamburger bun, ½; Muffin, 1 medium; Pancake, 1 medium; Pasta, 1/2 cup cooked; Liv, 1/2 large or 1 small; Popcorn, 1 cup popped; Pretzels, 1 ounce; Rice, white, brown, or wild, 1/2 cup cooked; Tortillas, corn or flour, 1 medium; Waffle, 1 medium; Wheat germ, 1/4 cup; \par Vegetables are rich sources of potassium, magnesium, and fiber. One serving is 1/2 cup of any of the following cooked vegetables:\par Asparagus, Beans (green, yellow), Beets, Broccoli, [place] Sprouts, Carrots, Cauliflower, [place], chicory, mustard and turnip (and other) greens, Corn, Kale, Lima beans, Mixed vegetables, Okra, Parsnips, Peas, green, Potatoes (1/2 medium or 1/2 cup mashed), Pumpkin, Rutabaga, Spaghetti or tomato sauce, Spinach, Squash (zucchini or yellow), Stewed tomatoes, Succotash, Sweet potatoes, Turnips, Yam \par Raw vegetables: Carrots,1/2 cup; Celery, 1/2 cup; Lettuce (edwar, loose-leaf, green-leaf), 1 cup; Peppers, 1/2 cup; Spinach, 1 cup; Tomato, 1/2\par Fruits and fruit juices are important sources of potassium and magnesium. Fruits also contain fiber and are low in sodium and fat. One serving equals:\par Any fruit juice, # cup (6 ounces); Canned or frozen fruit, ½ cup (includes applesauce); Dried fruit, ¼ cup; \par Fresh fruit:\par Apple, 1 medium; Apricots, 2 medium; Banana, 1 medium; Berries, 1/2 cup; Melon, 1 wedge, or 1/2 cup; Cherries, 10; Grapefruit, 1/2; Grapes, 15; Kiwi, 1 medium; Dereck, 1/2 small; Nectarine, 1 medium; Orange, 1 medium; Peach, 1 medium; Pear, 1 medium; Pineapple, 1/2 cup; Plums, 2 medium; Tangerine, 1 large\par Dairy foods provide protein and calcium. Use low-fat or nonfat dairy products to cut down on fat. One serving equals:\par Skim milk, 1 cup (8 ounces); 1% low fat milk, 1 cup (8 ounces); 2% low fat milk, 1 cup (8 ounces) nonfat dry milk powder (1/3 cup); Low-fat cottage cheese, 1 cup (8 ounces); Parmesan cheese, 1 tablespoon; Mozzarella cheese, part skim, 1/4 cup (1 ounce); Low-fat cheddar cheese, 11/2 ounces; Ricotta cheese, part skim milk or nonfat, 1/4 cup (11/2 ounces); Other low fat or nonfat cheeses (11/2 oz.); Low-fat or nonfat yogurt, fruit-flavored or plain, 1 cup (8 ounces)\par Low-fat or nonfat frozen yogurt, 1/2 cup (4 ounces); Note: People who can't digest dairy products can try taking lactase enzyme pills or drops (available at drug and grocery stores) when they eat dairy. There is also milk available with the enzyme already added. Or you can buy lactose-free milk.\par Meat, poultry, and fish are good sources of protein and magnesium. One serving equals:\par Lean meat including beef, veal, or pork, 3 ounces cooked; Skinless, white meat poultry including turkey, chicken, 3 ounces; Fish and shellfish, 3 ounces cooked; Low-fat luncheon meats, 1 ounce; Egg, 1 medium; Tofu, 3 ounces\par Note: Three ounces of cooked meat is about the size of a deck of cards.\par Nuts, seeds, and legumes are rich sources of energy, magnesium, potassium, protein and fiber. Nuts and seeds are also high in fat, so portions should be small.\par Almonds, 1/3 cup; Beans such as kidney, grossman, and navy, 1/2 cup cooked; Chickpeas and lentils, 1/2 cup cooked; Cashews, 1/3 cup; Filberts, 1/3 cup; Mixed nuts, 1/3 cup; Peanut butter, 2 tablespoons; Peanuts, 1/3 cup; Sesame seeds, 2 tablespoons; Sunflower seeds, 2 tablespoons; Tofu, regular, 3 ounces; Walnuts, 1/3 cup \par Following the above diet will give you about 27% fat in your diet. The goal is to have 30% or less of the calories you eat each day be from fat. To meet that goal, do not eat more than 2-3 servings daily of added fat. Also try to limit sweets. One serving equals:\par Butter or margarine, 1 teaspoon; Mayonnaise, 1 teaspoon; Low-fat mayonnaise, 1 tablespoon; Salad dressing, 1 tablespoon; Low-fat salad dressing, 2 tablespoons; Oil, 1 teaspoon (use olive, canola, safflower, or other vegetable oils); Candy, hard, 3 pieces; Jelly or jam, 1 tablespoon); Jell-O, 1/2 cup; Jelly beans, 1/2 ounce; Maple syrup, 1 tablespoon; Popsicle, 1; Sherbet or nonfat or low-fat frozen yogurt, 1/2 cup; Sugar, 1 tablespoon; Sugared lemonade or fruit punch, 1 cup (8 ounces); Note: Try diet fruit-flavored gelatin or frozen, canned, or fresh fruit for dessert.\par \par Small amounts of alcohol may have benefits to the heart and blood pressure. However, excess use of alcohol can cause damage to the brain, liver and other organs. It can lead to high blood pressure. Drinking more than two drinks (15 ml)  every day can raise your blood pressure. 15 ml of alcohol equals: \par • one 12-ounce bottle of beer \par • a half glass (5 ounces) of wine \par • 1 ounce (one shot) of 100 proof hard liquor\par

## 2019-05-30 NOTE — PHYSICAL EXAM
[General Appearance - Well Developed] : well developed [Normal Appearance] : normal appearance [Well Groomed] : well groomed [General Appearance - Well Nourished] : well nourished [No Deformities] : no deformities [General Appearance - In No Acute Distress] : no acute distress [Normal Conjunctiva] : the conjunctiva exhibited no abnormalities [Eyelids - No Xanthelasma] : the eyelids demonstrated no xanthelasmas [Normal Oral Mucosa] : normal oral mucosa [No Oral Pallor] : no oral pallor [No Oral Cyanosis] : no oral cyanosis [Normal Oropharynx] : normal oropharynx [Normal Jugular Venous A Waves Present] : normal jugular venous A waves present [Normal Jugular Venous V Waves Present] : normal jugular venous V waves present [No Jugular Venous Vargas A Waves] : no jugular venous vargas A waves [Respiration, Rhythm And Depth] : normal respiratory rhythm and effort [Exaggerated Use Of Accessory Muscles For Inspiration] : no accessory muscle use [Auscultation Breath Sounds / Voice Sounds] : lungs were clear to auscultation bilaterally [Abdomen Soft] : soft [Abdomen Tenderness] : non-tender [Abdomen Mass (___ Cm)] : no abdominal mass palpated [Limping On The Right] : limping on the right [Limping On The Left] : limping on the left [Gait - Steppage Right] : a steppage gait was noted on the right [Gait - Steppage Left] : a steppage gait was noted on the left [Nail Clubbing] : no clubbing of the fingernails [Cyanosis, Localized] : no localized cyanosis [Petechial Hemorrhages (___cm)] : no petechial hemorrhages [Skin Color & Pigmentation] : normal skin color and pigmentation [No Venous Stasis] : no venous stasis [Skin Lesions] : no skin lesions [No Skin Ulcers] : no skin ulcer [No Xanthoma] : no  xanthoma was observed [Oriented To Time, Place, And Person] : oriented to person, place, and time [Affect] : the affect was normal [Mood] : the mood was normal [No Anxiety] : not feeling anxious [5th Left ICS - MCL] : palpated at the 5th LICS in the midclavicular line [Normal] : normal [No Precordial Heave] : no precordial heave was noted [Normal Rate] : normal [Normal S1] : normal S1 [Rhythm Regular] : regular [Normal S2] : normal S2 [No Gallop] : no gallop heard [No Murmur] : no murmurs heard [I] : a grade 1 [] : is unchanged with valsalva [2+] : left 2+ [No Abnormalities] : the abdominal aorta was not enlarged and no bruit was heard [No Pitting Edema] : no pitting edema present [Prolonged Exp Time] : with normal expiratory time [Increased E/I Ratio] : with normal expiratory/inspiratory ratio  [FreeTextEntry1] : ankle circumference is 8 inches on the right and 8` inches on the left (previously 7.5 in) [Apical Thrill] : no thrill palpable at the apex [S3] : no S3 [S4] : no S4 [Click] : no click [Pericardial Rub] : no pericardial rub [Right Carotid Bruit] : no bruit heard over the right carotid [Left Carotid Bruit] : no bruit heard over the left carotid [Right Femoral Bruit] : no bruit heard over the right femoral artery [Left Femoral Bruit] : no bruit heard over the left femoral artery [Rt] : no varicose veins of the right leg [Lt] : no varicose veins of the left leg

## 2019-05-30 NOTE — ADDENDUM
[FreeTextEntry1] : I spent 60 min face to face time with the patient from 12:00 to 13:00 on 5/29/19. Thirty minutes were devoted to  patient counseling at outlined above in the End of Visit section.  I called and spoke with the patient and his wife by telephone at 9:50 on 5/30/19 to review findings on labs and revise plans accordingly. Prescription for pravastatin was electronically transmitted to pharmacy.

## 2019-06-05 ENCOUNTER — APPOINTMENT (OUTPATIENT)
Dept: PULMONOLOGY | Facility: CLINIC | Age: 69
End: 2019-06-05
Payer: COMMERCIAL

## 2019-06-05 VITALS
DIASTOLIC BLOOD PRESSURE: 74 MMHG | TEMPERATURE: 98.1 F | SYSTOLIC BLOOD PRESSURE: 124 MMHG | WEIGHT: 166.25 LBS | HEIGHT: 63 IN | HEART RATE: 92 BPM | BODY MASS INDEX: 29.46 KG/M2 | OXYGEN SATURATION: 96 %

## 2019-06-05 DIAGNOSIS — J30.89 OTHER ALLERGIC RHINITIS: ICD-10-CM

## 2019-06-05 PROCEDURE — 99213 OFFICE O/P EST LOW 20 MIN: CPT

## 2019-06-05 NOTE — ASSESSMENT
[FreeTextEntry1] : obstructive sleep apnea\par allergic rhinitis\par \par Asked [employer] to get new equipt if eligible.  Will then be able to get objective compliance and efficacy data.  Use OTC nasal steroids for next month and fexofenadine as needed\par \par f/u annually if OK, or 2 months after gets new machine

## 2019-06-05 NOTE — HISTORY OF PRESENT ILLNESS
[FreeTextEntry1] : 10/17/18:  Last seen 2016 for rx of sleep apnea.  Hx fibromyalgia, chronic pain on chronic narcotic analgesics.  Feels more rested when uses CPAP but compliance has never been optimal. On CPAP since 2014, overnight polysomnography 2013 showed Apnea Hypopnea Index = 22.  Recently has come off of Fentanyl patch, which should reduce obstructive sleep apnea.  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 Machine probably fronm feb 2014- may be due for replacement.  Continues to complain needs new interface more than once a month, using Questra nasal, turns out he is boiling the interface to sterilize it.  Told him to stop. \par \par Most recent unattended home sleep testing shows lower AHI now that mostly off narcotic analgesics:  Apnea Hypopnea Index on unattended home sleep testing now 11.9. \par \par 6/5/19: Now appears to be compliant with CPAP, sleeps better with it.  Thinks he is due for new machine- will order.  Advised about use of travel machine.  Recent nasal congestion, allergy sx, itchy eyes, sneezing.

## 2019-06-05 NOTE — PHYSICAL EXAM
[General Appearance - Well Developed] : well developed [Normal Appearance] : normal appearance [Well Groomed] : well groomed [No Deformities] : no deformities [General Appearance - Well Nourished] : well nourished [General Appearance - In No Acute Distress] : no acute distress [Normal Conjunctiva] : the conjunctiva exhibited no abnormalities [Eyelids - No Xanthelasma] : the eyelids demonstrated no xanthelasmas [Normal Oropharynx] : normal oropharynx [III] : III [FreeTextEntry1] : no tonsils; boggy nasal mucosa [Auscultation Breath Sounds / Voice Sounds] : lungs were clear to auscultation bilaterally [Nail Clubbing] : no clubbing of the fingernails [Cyanosis, Localized] : no localized cyanosis [Petechial Hemorrhages (___cm)] : no petechial hemorrhages [] : no ischemic changes

## 2019-09-06 ENCOUNTER — RX RENEWAL (OUTPATIENT)
Age: 69
End: 2019-09-06

## 2019-09-11 ENCOUNTER — APPOINTMENT (OUTPATIENT)
Dept: PULMONOLOGY | Facility: CLINIC | Age: 69
End: 2019-09-11
Payer: COMMERCIAL

## 2019-09-11 VITALS
WEIGHT: 152 LBS | DIASTOLIC BLOOD PRESSURE: 70 MMHG | HEIGHT: 63 IN | SYSTOLIC BLOOD PRESSURE: 110 MMHG | OXYGEN SATURATION: 98 % | HEART RATE: 93 BPM | BODY MASS INDEX: 26.93 KG/M2 | TEMPERATURE: 97.9 F

## 2019-09-11 PROCEDURE — 99214 OFFICE O/P EST MOD 30 MIN: CPT

## 2019-09-11 NOTE — ASSESSMENT
[FreeTextEntry1] : Doing very well with CPAP, excellent compliance and efficacy.  Given long term advice about CPAP use.  Call durable medical equipment provider provider if any equipment problems.  Replace interface as per schedule, generally at last every 3 months.  Stressed importance of CPAP compliance.  Return to see me in about 6 months if doing well.\par \par Told to switch to heated hose.  Given rx for fluticasone nasal for sinus sx, may be seasonal allergic rhinitis\par \par

## 2019-09-11 NOTE — PHYSICAL EXAM
[General Appearance - Well Developed] : well developed [Normal Appearance] : normal appearance [General Appearance - Well Nourished] : well nourished [Well Groomed] : well groomed [No Deformities] : no deformities [General Appearance - In No Acute Distress] : no acute distress [Normal Conjunctiva] : the conjunctiva exhibited no abnormalities [Eyelids - No Xanthelasma] : the eyelids demonstrated no xanthelasmas [Normal Oropharynx] : normal oropharynx [III] : III [FreeTextEntry1] : no tonsils; boggy nasal mucosa [Neck Appearance] : the appearance of the neck was normal [Jugular Venous Distention Increased] : there was no jugular-venous distention [Neck Cervical Mass (___cm)] : no neck mass was observed [Thyroid Diffuse Enlargement] : the thyroid was not enlarged [Heart Rate And Rhythm] : heart rate was normal and rhythm regular [Thyroid Nodule] : there were no palpable thyroid nodules [Heart Sounds Gallop] : no gallops [Heart Sounds] : normal S1 and S2 [Heart Sounds Pericardial Friction Rub] : no pericardial rub [Murmurs] : no murmurs [Auscultation Breath Sounds / Voice Sounds] : lungs were clear to auscultation bilaterally [Abnormal Walk] : normal gait [Musculoskeletal - Swelling] : no joint swelling seen [Motor Tone] : muscle strength and tone were normal [Nail Clubbing] : no clubbing of the fingernails [Cyanosis, Localized] : no localized cyanosis [Petechial Hemorrhages (___cm)] : no petechial hemorrhages [] : no ischemic changes

## 2019-09-11 NOTE — HISTORY OF PRESENT ILLNESS
[FreeTextEntry1] : 10/17/18:  Last seen 2016 for rx of sleep apnea.  Hx fibromyalgia, chronic pain on chronic narcotic analgesics.  Feels more rested when uses CPAP but compliance has never been optimal. On CPAP since 2014, overnight polysomnography 2013 showed Apnea Hypopnea Index = 22.  Recently has come off of Fentanyl patch, which should reduce obstructive sleep apnea.  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 Machine probably fronm feb 2014- may be due for replacement.  Continues to complain needs new interface more than once a month, using Dreamwear nasal, turns out he is boiling the interface to sterilize it.  Told him to stop. \par \par Most recent unattended home sleep testing shows lower AHI now that mostly off narcotic analgesics:  Apnea Hypopnea Index on unattended home sleep testing now 11.9. \par \par 6/5/19: Now appears to be compliant with CPAP, sleeps better with it.  Thinks he is due for new machine- will order.  Advised about use of travel machine.  Recent nasal congestion, allergy sx, itchy eyes, sneezing.\par \par 9/11/19: doing well with new machine. Still has chronic pain on opiates.  Complains of dry mouth and some claustrophobia.  Using Dreamwear under nose mask, not using heated tubing (wants longer than 6 ft).  Compliance today 100%, average >5h, Apnea Hypopnea Index 1.5.  durable medical equipment provider is J[employer], machine [employer] A10.

## 2019-12-05 ENCOUNTER — RX RENEWAL (OUTPATIENT)
Age: 69
End: 2019-12-05

## 2019-12-18 ENCOUNTER — APPOINTMENT (OUTPATIENT)
Dept: CARDIOLOGY | Facility: CLINIC | Age: 69
End: 2019-12-18

## 2020-02-27 DIAGNOSIS — R42 DIZZINESS AND GIDDINESS: ICD-10-CM

## 2020-03-11 ENCOUNTER — APPOINTMENT (OUTPATIENT)
Dept: PULMONOLOGY | Facility: CLINIC | Age: 70
End: 2020-03-11
Payer: COMMERCIAL

## 2020-03-11 VITALS
SYSTOLIC BLOOD PRESSURE: 110 MMHG | HEART RATE: 64 BPM | WEIGHT: 146 LBS | BODY MASS INDEX: 25.87 KG/M2 | OXYGEN SATURATION: 94 % | TEMPERATURE: 98.8 F | RESPIRATION RATE: 12 BRPM | DIASTOLIC BLOOD PRESSURE: 70 MMHG | HEIGHT: 63 IN

## 2020-03-11 PROCEDURE — 99214 OFFICE O/P EST MOD 30 MIN: CPT

## 2020-03-12 NOTE — HISTORY OF PRESENT ILLNESS
[FreeTextEntry1] : 10/17/18:  Last seen 2016 for rx of sleep apnea.  Hx fibromyalgia, chronic pain on chronic narcotic analgesics.  Feels more rested when uses CPAP but compliance has never been optimal. On CPAP since 2014, overnight polysomnography 2013 showed Apnea Hypopnea Index = 22.  Recently has come off of Fentanyl patch, which should reduce obstructive sleep apnea.  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 Machine probably fronm feb 2014- may be due for replacement.  Continues to complain needs new interface more than once a month, using Dreamwear nasal, turns out he is boiling the interface to sterilize it.  Told him to stop. \par \par Most recent unattended home sleep testing shows lower AHI now that mostly off narcotic analgesics:  Apnea Hypopnea Index on unattended home sleep testing now 11.9. \par \par 6/5/19: Now appears to be compliant with CPAP, sleeps better with it.  Thinks he is due for new machine- will order.  Advised about use of travel machine.  Recent nasal congestion, allergy sx, itchy eyes, sneezing.\par \par 9/11/19: doing well with new machine. Still has chronic pain on opiates.  Complains of dry mouth and some claustrophobia.  Using Dreamwear under nose mask, not using heated tubing (wants longer than 6 ft).  Compliance today 100%, average >5h, Apnea Hypopnea Index 1.5.  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  \par \par 3/11/2020: Chronic pain on (less?) opiates.  Complaining of restless legs, typical sx during the day, has been treated with pramipexole by another MD, using 1.5 mg with additional 0.25mg if needed.  Also recently using temazepam for sleep.  Reviewing   shows Dr Juni Monsalve has prescribed Sunosi, Modafinil and oxycodone.

## 2020-03-12 NOTE — PHYSICAL EXAM
[General Appearance - Well Developed] : well developed [Normal Appearance] : normal appearance [Well Groomed] : well groomed [General Appearance - Well Nourished] : well nourished [No Deformities] : no deformities [General Appearance - In No Acute Distress] : no acute distress [Normal Oropharynx] : normal oropharynx [III] : III [Heart Rate And Rhythm] : heart rate was normal and rhythm regular [Heart Sounds] : normal S1 and S2 [Heart Sounds Gallop] : no gallops [Murmurs] : no murmurs [Heart Sounds Pericardial Friction Rub] : no pericardial rub [] : no respiratory distress [Auscultation Breath Sounds / Voice Sounds] : lungs were clear to auscultation bilaterally [Deep Tendon Reflexes (DTR)] : deep tendon reflexes were 2+ and symmetric [Sensation] : the sensory exam was normal to light touch and pinprick [No Focal Deficits] : no focal deficits [FreeTextEntry1] : tremors

## 2020-03-12 NOTE — ASSESSMENT
[FreeTextEntry1] : Recent CPAP compliance acceptable.  Usage 63% >4H, 97% total, Apnea Hypopnea Index on rx = 3.2.  Given long term advice about CPAP use.  Call durable medical equipment provider provider if any equipment problems.  Replace interface as per schedule, generally at least every 3 months.  Stressed importance of CPAP compliance.  Return to see me in about 6 months if doing well.\par \par I have renewed his mirapex as courtesy, will leave treatment with stimulant and hypnotic medications to Dr Monsalve\par \par

## 2020-08-17 ENCOUNTER — APPOINTMENT (OUTPATIENT)
Dept: PULMONOLOGY | Facility: CLINIC | Age: 70
End: 2020-08-17

## 2020-09-23 ENCOUNTER — APPOINTMENT (OUTPATIENT)
Dept: PULMONOLOGY | Facility: CLINIC | Age: 70
End: 2020-09-23
Payer: COMMERCIAL

## 2020-09-23 VITALS
BODY MASS INDEX: 29.41 KG/M2 | WEIGHT: 166 LBS | DIASTOLIC BLOOD PRESSURE: 80 MMHG | SYSTOLIC BLOOD PRESSURE: 130 MMHG | RESPIRATION RATE: 12 BRPM | OXYGEN SATURATION: 97 % | TEMPERATURE: 98.3 F | HEIGHT: 63 IN | HEART RATE: 85 BPM

## 2020-09-23 PROCEDURE — 99214 OFFICE O/P EST MOD 30 MIN: CPT

## 2020-09-23 NOTE — ASSESSMENT
[FreeTextEntry1] : obstructive sleep apnea well controlled  with CPAP.  Given long term advice about CPAP use.  Call durable medical equipment provider provider if any equipment problems.  Replace interface as per schedule, generally at least every 3 months.  Stressed importance of CPAP compliance.  Return to see me in about 6 months if doing well. \par \par dyspnea- exam normal.  Get pulmonary function testing, will speak to him when available

## 2020-09-23 NOTE — PHYSICAL EXAM
[General Appearance - Well Developed] : well developed [Normal Appearance] : normal appearance [Well Groomed] : well groomed [General Appearance - Well Nourished] : well nourished [No Deformities] : no deformities [General Appearance - In No Acute Distress] : no acute distress [Normal Oropharynx] : normal oropharynx [III] : III [FreeTextEntry1] : no tonsils; boggy nasal mucosa [Heart Rate And Rhythm] : heart rate was normal and rhythm regular [Heart Sounds] : normal S1 and S2 [Heart Sounds Gallop] : no gallops [Murmurs] : no murmurs [Heart Sounds Pericardial Friction Rub] : no pericardial rub [Auscultation Breath Sounds / Voice Sounds] : lungs were clear to auscultation bilaterally [Abnormal Walk] : normal gait [Musculoskeletal - Swelling] : no joint swelling seen [Motor Tone] : muscle strength and tone were normal [Nail Clubbing] : no clubbing of the fingernails [Cyanosis, Localized] : no localized cyanosis [Petechial Hemorrhages (___cm)] : no petechial hemorrhages [] : no ischemic changes [Deep Tendon Reflexes (DTR)] : deep tendon reflexes were 2+ and symmetric [Sensation] : the sensory exam was normal to light touch and pinprick [No Focal Deficits] : no focal deficits

## 2020-09-23 NOTE — HISTORY OF PRESENT ILLNESS
[FreeTextEntry1] : 10/17/18:  Last seen 2016 for rx of sleep apnea.  Hx fibromyalgia, chronic pain on chronic narcotic analgesics.  Feels more rested when uses CPAP but compliance has never been optimal. On CPAP since 2014, overnight polysomnography 2013 showed Apnea Hypopnea Index = 22.  Recently has come off of Fentanyl patch, which should reduce obstructive sleep apnea.  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 Machine probably fronm feb 2014- may be due for replacement.  Continues to complain needs new interface more than once a month, using Dreamwear nasal, turns out he is boiling the interface to sterilize it.  Told him to stop. \par \par Most recent unattended home sleep testing shows lower AHI now that mostly off narcotic analgesics:  Apnea Hypopnea Index on unattended home sleep testing now 11.9. \par \par 6/5/19: Now appears to be compliant with CPAP, sleeps better with it.  Thinks he is due for new machine- will order.  Advised about use of travel machine.  Recent nasal congestion, allergy sx, itchy eyes, sneezing.\par \par 9/11/19: doing well with new machine. Still has chronic pain on opiates.  Complains of dry mouth and some claustrophobia.  Using Dreamwear under nose mask, not using heated tubing (wants longer than 6 ft).  Compliance today 100%, average >5h, Apnea Hypopnea Index 1.5.  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  \par \par 3/11/2020: Chronic pain on (less?) opiates.  Complaining of restless legs, typical sx during the day, has been treated with pramipexole by another MD, using 1.5 mg with additional 0.25mg if needed.  Also recently using temazepam for sleep.  Reviewing   shows Dr Juni Monsalve has prescribed Sunosi, Modafinil and oxycodone. \par \par 9/23/20: here 35 min late. CPAP compliance downloaded- 100% use >4h, Apnea Hypopnea Index on rx = 1.6, P median 9.0, all good but large leak.  He is aware of this- does not like mask tight because he gets claustrophobic.  On modafinil for excessive daytime somnolence per his neurologist, Sunosi stopped. on oxycodone for pain.  Today c/o dyspnea on exertion past few months.  No smoking decades, tells me had pulmonary function testing 3 yrs ago normal. No wheeze or cough.  Has used albuterol in past, ? prior dx asthma.

## 2020-10-12 ENCOUNTER — OUTPATIENT (OUTPATIENT)
Dept: OUTPATIENT SERVICES | Facility: HOSPITAL | Age: 70
LOS: 1 days | End: 2020-10-12
Payer: COMMERCIAL

## 2020-10-12 DIAGNOSIS — R06.00 DYSPNEA, UNSPECIFIED: ICD-10-CM

## 2020-10-12 LAB — SARS-COV-2 N GENE NPH QL NAA+PROBE: NOT DETECTED

## 2020-10-12 PROCEDURE — 94729 DIFFUSING CAPACITY: CPT | Mod: 26

## 2020-10-12 PROCEDURE — 94760 N-INVAS EAR/PLS OXIMETRY 1: CPT

## 2020-10-12 PROCEDURE — 94060 EVALUATION OF WHEEZING: CPT

## 2020-10-12 PROCEDURE — 94729 DIFFUSING CAPACITY: CPT

## 2020-10-12 PROCEDURE — 94726 PLETHYSMOGRAPHY LUNG VOLUMES: CPT

## 2020-10-12 PROCEDURE — 94726 PLETHYSMOGRAPHY LUNG VOLUMES: CPT | Mod: 26

## 2020-10-12 PROCEDURE — 94010 BREATHING CAPACITY TEST: CPT | Mod: 26

## 2020-10-28 ENCOUNTER — NON-APPOINTMENT (OUTPATIENT)
Age: 70
End: 2020-10-28

## 2020-10-28 ENCOUNTER — APPOINTMENT (OUTPATIENT)
Dept: CARDIOLOGY | Facility: CLINIC | Age: 70
End: 2020-10-28
Payer: COMMERCIAL

## 2020-10-28 ENCOUNTER — OUTPATIENT (OUTPATIENT)
Dept: OUTPATIENT SERVICES | Facility: HOSPITAL | Age: 70
LOS: 1 days | End: 2020-10-28
Payer: COMMERCIAL

## 2020-10-28 ENCOUNTER — APPOINTMENT (OUTPATIENT)
Dept: CV DIAGNOSITCS | Facility: HOSPITAL | Age: 70
End: 2020-10-28

## 2020-10-28 ENCOUNTER — LABORATORY RESULT (OUTPATIENT)
Age: 70
End: 2020-10-28

## 2020-10-28 VITALS — DIASTOLIC BLOOD PRESSURE: 67 MMHG | HEART RATE: 62 BPM | OXYGEN SATURATION: 96 % | SYSTOLIC BLOOD PRESSURE: 137 MMHG

## 2020-10-28 VITALS
TEMPERATURE: 97.2 F | SYSTOLIC BLOOD PRESSURE: 120 MMHG | DIASTOLIC BLOOD PRESSURE: 65 MMHG | HEIGHT: 63 IN | BODY MASS INDEX: 29.06 KG/M2 | RESPIRATION RATE: 16 BRPM | OXYGEN SATURATION: 97 % | HEART RATE: 63 BPM | WEIGHT: 164 LBS

## 2020-10-28 VITALS — HEART RATE: 66 BPM | DIASTOLIC BLOOD PRESSURE: 65 MMHG | SYSTOLIC BLOOD PRESSURE: 110 MMHG | OXYGEN SATURATION: 96 %

## 2020-10-28 VITALS — HEART RATE: 62 BPM | DIASTOLIC BLOOD PRESSURE: 67 MMHG | OXYGEN SATURATION: 96 % | SYSTOLIC BLOOD PRESSURE: 123 MMHG

## 2020-10-28 VITALS — HEART RATE: 67 BPM | OXYGEN SATURATION: 97 % | DIASTOLIC BLOOD PRESSURE: 67 MMHG | SYSTOLIC BLOOD PRESSURE: 133 MMHG

## 2020-10-28 DIAGNOSIS — I34.0 NONRHEUMATIC MITRAL (VALVE) INSUFFICIENCY: ICD-10-CM

## 2020-10-28 DIAGNOSIS — R06.00 DYSPNEA, UNSPECIFIED: ICD-10-CM

## 2020-10-28 DIAGNOSIS — I51.7 CARDIOMEGALY: ICD-10-CM

## 2020-10-28 DIAGNOSIS — I10 ESSENTIAL (PRIMARY) HYPERTENSION: ICD-10-CM

## 2020-10-28 DIAGNOSIS — G47.33 OBSTRUCTIVE SLEEP APNEA (ADULT) (PEDIATRIC): ICD-10-CM

## 2020-10-28 PROCEDURE — 93040 RHYTHM ECG WITH REPORT: CPT | Mod: 59

## 2020-10-28 PROCEDURE — 99072 ADDL SUPL MATRL&STAF TM PHE: CPT

## 2020-10-28 PROCEDURE — 36415 COLL VENOUS BLD VENIPUNCTURE: CPT

## 2020-10-28 PROCEDURE — 93306 TTE W/DOPPLER COMPLETE: CPT | Mod: 26

## 2020-10-28 PROCEDURE — 93000 ELECTROCARDIOGRAM COMPLETE: CPT

## 2020-10-28 PROCEDURE — 99215 OFFICE O/P EST HI 40 MIN: CPT

## 2020-10-29 ENCOUNTER — NON-APPOINTMENT (OUTPATIENT)
Age: 70
End: 2020-10-29

## 2020-10-29 LAB
25(OH)D3 SERPL-MCNC: 40.1 NG/ML
ALBUMIN SERPL ELPH-MCNC: 4.5 G/DL
ALP BLD-CCNC: 87 U/L
ALT SERPL-CCNC: 26 U/L
ANION GAP SERPL CALC-SCNC: 19 MMOL/L
AST SERPL-CCNC: 30 U/L
BASOPHILS # BLD AUTO: 0.09 K/UL
BASOPHILS NFR BLD AUTO: 1.2 %
BILIRUB SERPL-MCNC: 0.4 MG/DL
BUN SERPL-MCNC: 21 MG/DL
CALCIUM SERPL-MCNC: 9.4 MG/DL
CHLORIDE SERPL-SCNC: 93 MMOL/L
CHOLEST SERPL-MCNC: 198 MG/DL
CO2 SERPL-SCNC: 24 MMOL/L
CREAT SERPL-MCNC: 1.09 MG/DL
CREAT SPEC-SCNC: 77 MG/DL
CRP SERPL HS-MCNC: 1.64 MG/L
EOSINOPHIL # BLD AUTO: 0.1 K/UL
EOSINOPHIL NFR BLD AUTO: 1.4 %
ESTIMATED AVERAGE GLUCOSE: 108 MG/DL
GLUCOSE SERPL-MCNC: 31 MG/DL
HBA1C MFR BLD HPLC: 5.4 %
HCT VFR BLD CALC: 43.8 %
HDLC SERPL-MCNC: 73 MG/DL
HGB BLD-MCNC: 14.1 G/DL
IMM GRANULOCYTES NFR BLD AUTO: 0.1 %
IRON SATN MFR SERPL: 28 %
IRON SERPL-MCNC: 101 UG/DL
LDLC SERPL CALC-MCNC: 108 MG/DL
LDLC SERPL DIRECT ASSAY-MCNC: 116 MG/DL
LYMPHOCYTES # BLD AUTO: 1.39 K/UL
LYMPHOCYTES NFR BLD AUTO: 19.1 %
MAN DIFF?: NORMAL
MCHC RBC-ENTMCNC: 31.1 PG
MCHC RBC-ENTMCNC: 32.2 GM/DL
MCV RBC AUTO: 96.5 FL
MICROALBUMIN 24H UR DL<=1MG/L-MCNC: 1.8 MG/DL
MICROALBUMIN/CREAT 24H UR-RTO: 23 MG/G
MONOCYTES # BLD AUTO: 0.81 K/UL
MONOCYTES NFR BLD AUTO: 11.2 %
NEUTROPHILS # BLD AUTO: 4.86 K/UL
NEUTROPHILS NFR BLD AUTO: 67 %
NONHDLC SERPL-MCNC: 125 MG/DL
PLATELET # BLD AUTO: 306 K/UL
POTASSIUM SERPL-SCNC: 4.3 MMOL/L
PROT SERPL-MCNC: 7 G/DL
RBC # BLD: 4.54 M/UL
RBC # FLD: 18.9 %
SODIUM ?TM SUB UR QN: 26 MMOL/L
SODIUM SERPL-SCNC: 135 MMOL/L
T4 FREE SERPL-MCNC: 1.3 NG/DL
TIBC SERPL-MCNC: 365 UG/DL
TRIGL SERPL-MCNC: 86 MG/DL
TSH SERPL-ACNC: 1.08 UIU/ML
UIBC SERPL-MCNC: 264 UG/DL
URATE SERPL-MCNC: 6.4 MG/DL
WBC # FLD AUTO: 7.26 K/UL

## 2020-10-29 PROCEDURE — 99442: CPT

## 2020-10-29 RX ORDER — TOBRAMYCIN AND DEXAMETHASONE 3; 1 MG/ML; MG/ML
0.3-0.1 SUSPENSION/ DROPS OPHTHALMIC
Qty: 7 | Refills: 0 | Status: COMPLETED | COMMUNITY
Start: 2020-06-23

## 2020-10-29 RX ORDER — SILDENAFIL 100 MG/1
100 TABLET, FILM COATED ORAL
Qty: 18 | Refills: 0 | Status: COMPLETED | COMMUNITY
Start: 2020-08-26

## 2020-10-29 RX ORDER — DICYCLOMINE HYDROCHLORIDE 10 MG/1
10 CAPSULE ORAL
Qty: 60 | Refills: 0 | Status: DISCONTINUED | COMMUNITY
Start: 2017-06-20 | End: 2020-10-29

## 2020-10-29 RX ORDER — TIMOLOL MALEATE 2.5 MG/ML
0.25 SOLUTION/ DROPS OPHTHALMIC
Qty: 15 | Refills: 0 | Status: COMPLETED | COMMUNITY
Start: 2020-06-10

## 2020-10-29 RX ORDER — SOLRIAMFETOL 150 MG/1
150 TABLET, FILM COATED ORAL
Qty: 30 | Refills: 0 | Status: COMPLETED | COMMUNITY
Start: 2020-06-05

## 2020-10-29 RX ORDER — TIMOLOL MALEATE 5 MG/ML
0.5 SOLUTION OPHTHALMIC
Qty: 5 | Refills: 0 | Status: COMPLETED | COMMUNITY
Start: 2020-06-04

## 2020-10-29 RX ORDER — PRAVASTATIN SODIUM 10 MG/1
10 TABLET ORAL
Qty: 90 | Refills: 3 | Status: DISCONTINUED | COMMUNITY
Start: 2019-05-30 | End: 2020-10-29

## 2020-10-29 RX ORDER — NALDEMEDINE 0.2 MG/1
0.2 TABLET ORAL
Qty: 30 | Refills: 0 | Status: COMPLETED | COMMUNITY
Start: 2020-03-02

## 2020-10-29 RX ORDER — PRAMIPEXOLE DIHYDROCHLORIDE 0.25 MG/1
0.25 TABLET ORAL
Qty: 90 | Refills: 0 | Status: COMPLETED | COMMUNITY
Start: 2020-06-17

## 2020-10-29 RX ORDER — SODIUM CHLORIDE FOR INHALATION 0.9 %
0.9 VIAL, NEBULIZER (ML) INHALATION
Qty: 300 | Refills: 0 | Status: COMPLETED | COMMUNITY
Start: 2020-10-08

## 2020-10-29 RX ORDER — CHLORHEXIDINE GLUCONATE, 0.12% ORAL RINSE 1.2 MG/ML
0.12 SOLUTION DENTAL
Qty: 473 | Refills: 0 | Status: DISCONTINUED | COMMUNITY
Start: 2017-05-02 | End: 2020-10-29

## 2020-10-29 RX ORDER — HYDROCODONE BITARTRATE AND ACETAMINOPHEN 10; 325 MG/1; MG/1
10-325 TABLET ORAL
Qty: 120 | Refills: 0 | Status: COMPLETED | COMMUNITY
Start: 2020-09-25

## 2020-10-29 RX ORDER — TRIAMCINOLONE ACETONIDE 55 UG/1
55 SOLUTION/ DROPS OPHTHALMIC
Qty: 16 | Refills: 0 | Status: COMPLETED | COMMUNITY
Start: 2020-08-20

## 2020-10-29 RX ORDER — HYDROCORTISONE 25 MG/G
2.5 CREAM TOPICAL
Qty: 30 | Refills: 0 | Status: COMPLETED | COMMUNITY
Start: 2020-05-14

## 2020-10-29 NOTE — PHYSICAL EXAM
[Prolonged Exp Time] : with normal expiratory time [Increased E/I Ratio] : with normal expiratory/inspiratory ratio  [FreeTextEntry1] : ankle circumf 8 in right and 7.75` inches on the left (previously 8 in bilat) [Apical Thrill] : no thrill palpable at the apex [S3] : no S3 [S4] : no S4 [Click] : no click [Pericardial Rub] : no pericardial rub [Right Carotid Bruit] : no bruit heard over the right carotid [Left Carotid Bruit] : no bruit heard over the left carotid [Right Femoral Bruit] : no bruit heard over the right femoral artery [Left Femoral Bruit] : no bruit heard over the left femoral artery [Rt] : no varicose veins of the right leg [Lt] : no varicose veins of the left leg

## 2020-10-29 NOTE — ADDENDUM
[FreeTextEntry1] : I spent 60 min face to face time with the patient from 12:00 to 13:00 on 10/28/2020. Thirty minutes were devoted to  patient counseling at outlined above in the End of Visit section. Prior to this visit, I reviewed office pulmonary records. After the visit, I personally reviewed images of echocardiogram with Dr Wilmar Lainez, performing physician. I returned call from patient at 19:05 on 10/29/2020 and reviewed findings of echoardiogram and lab tests.

## 2020-10-29 NOTE — HISTORY OF PRESENT ILLNESS
[FreeTextEntry1] : Mr. Gomez has h/o  HTN with LVH and stage I diastolic dysfunction on TTE, mild MR by TTE, HLD, recurrent sinusitis, diffuse arthritis, spinal stenosis (with need for opioid for pain relief) anxiety, asthma, central sleep apnea, depression, elevated CRP, elevatged Hgb A1C, elevated transaminase, gastritis, glaucoma,  hypokalemia, insomnia, iron deficiency anemia due to chronic blood loss,  myalgia and myositis, restless leg syndrome, intermittent vertigo and vitamin D deficiency. He was initially seen by me 1999 for elevated and labile BP in the setting of painful osteoarthritis, degenerative spinal stenosis, seasonal allergies with chronic recurrent sinusitis and peptic ulcer disease / gastritis, presumably due to chronic use of NSAIDs. He was on FERNANDO-2 inhibitors as well. At the time of office assessment on 9/20/10, he complained of chronic fatigue, neck and back pain (particularly lower back). He was no longer taking clonidine, for BP and was on stable dose of milnacipran (Savella) for fibromyalgia. There was insomnia and history of heavy snoring. He used temazepam (Restoril) intermittently. Self-obtained SBP was 120-135 mm Hg and DBP  80-85 mm Hg. Pulse was frequently between 100-115 bpm. On 10/11/13, a mild right sided carotid bruit was noted. At the time of office visit on 11/15/13, there was intermittent chest pain and shortness of breath, though not always associated with exertion. Physical exertion (eg walking) was limited by arthritic pain. In June 2014, he was diagnosed with central sleep apnea. He started CPAP. He noted more energy for awhile, but soon afterwards, there was recurrence of fatigue. He used Provigil. He complained of constant diaphoresis and frequent headaches. There was no chest pain, shortness of breath or lightheadedness at time of assessment. At the time of office visit on 10/23/15, he was not using CPAP daily, due to sense of claustrophobia. He used samples of indomethacin and allopurinol for painful joints, which were thought to be gout (though not assessed). He adjusted diet and lost weight since April 2015 visit. He underwent pulmonary re-assessment on 10/7/16 and was fitted for new CPAP for ROSALIE.  On 2/6/18, he underwent spinal surgery (minimally invasive posterior lumbar fusion surgery of L5 - S1) with Dr. Reinaldo Downs at [facility].  At the time of office visit on 5/25/18, he felt well. He was off most pain medication since undergoing surgery. Medications were reconciled. Mr. Gomez was not using fish oil. There was no longer dry mouth since decreasing dose of clonidine. At the time of office visit on 5/29/19, he started THC for pain management under direction of Dr. Epi Ribera, physical medicine and rehab at [facility]. He was prescribed atorvastatin 10 mg daily, but stopped after two weeks because of nausea. On 10/28/2020 he was no longer on THC, but was using Celebrex, lidocaine 5% external patch, oxycodone and oxycontin for pain management. He gained weight during COVID-19 pandemic, having decreased to as low as 134 lbs in March 2020. Pravastatin was stopped after 2-3 weeks due to abdominal discomfort. He used Moringa seeds for pain, but stopped because pain worsened.

## 2020-10-29 NOTE — DISCUSSION/SUMMARY
[FreeTextEntry1] : \par WEIGHT GOALS:\par \par Category				BMI range - kg/m2	BMI Prime\par Very severely underweight		less than 15		less than 0.60	\par Severely underweight			from 15.0 to 16.0		from 0.60 to 0.64	\par Underweight				from 16.0 to 18.5		from 0.64 to 0.74	\par Normal (healthy weight)			from 18.5 to 25		from 0.74 to 1.0	\par Overweight				from 25 to 30		from 1.0 to 1.2	\par Obese Class I (Moderately obese)		from 30 to 35		from 1.2 to 1.4	\par Obese Class II (Severely obese)		from 35 to 40		from 1.4 to 1.6	\par Obese Class III (Very severely obese)	over 40			over 1.6	\par \par Your current weight is 164 lbs (168 lbs on 5/29/19; 163 lbs on 5/23/18). Given current weight and height 5'3", your calculated body mass index (BMI) is 29.8 kg/sqm. Normal BMI is 18.5-25 kg/sqm. Thus current weight is in the overweight category. Abdominal waist circumference is measured at the level of the umbilicus and is thus not a pants waist measurement. Your current abdominal waist circumference is 41 in (39.5 in on 5/23/18). Normal abdominal waist circumference is < 32 inches for women and < 37 inches for men\par \par DIETARY SALT (SODIUM); DASH DIET AND BLOOD PRESSURE:\par To decrease the sodium in your diet: \par · Use fresh vegetables and foods as much as possible.\par · Avoid canned and processed foods. Cured meats such as rodriguez, ham, and sausages are high in salt.\par · Try using different herbs and spices in your cooking instead of salt.\par · In restaurants, avoid foods with sauces, cheese, and cured meats. Ask for low-sodium choices.\par To get more potassium in your diet, eat:\par · Bananas, fresh or dried apricots, peaches, citrus fruits, melons\par · Cauliflower, broccoli, tomatoes, carrots, raw spinach, beet greens, potatoes\par To get more magnesium in your diet, eat:\par · Whole grain foods, leafy green vegetables, dried fruits\par • Fish and seafood, poultry \par To get more calcium in your diet, eat:\par · Nonfat milk, yogurt, and low-fat cheeses \par · [place] and sardines\par · Cooked dried beans\par · Broccoli, kale, and bok filiberto\par · Tofu or soybean curd\par DASH stands for "dietary approaches to stop hypertension." The DASH diet is low in total and saturated fat. It is rich in fruits, vegetables, and low-fat dairy foods. The diet allows you to get natural fiber, calcium, and magnesium from food. It prevents or lowers high blood pressure. It can also help lower cholesterol in your blood. \par Don't change how you eat all at once. It's much more likely that you'll succeed if you make only one or two small changes at a time. Wait until those changes are a habit, then make a couple more changes. Some good starting steps include: \par Add one serving of vegetables to your meals at lunch and dinner. This is an easy way to help you get more vegetables in your diet. \par Have a piece of fruit as an afternoon or after-dinner snack. One glass of juice at breakfast is not enough fruit in your diet. \par Use half your usual amount of butter, margarine, or salad dressing. \par Buy nonfat salad dressing or nonfat sour cream.\par Follow this guide to select your menu of meals. The number of calories we want you to eat each day will tell you how many servings you can choose from each food group.\par Calories: 1600 2100 2600 3100  Servings Grains 6 7 ½ 10 ½ 12 ½  Vegetables 	 4 4 ½ 5 6 Fruit 4 5 5 6 Dairy (low-fat) 2 ½ 3 3 3 ½  Meat, poultry, fish ½ 1 ½ 2 2 ½  Nuts, seeds ½ ½ ½ 1 Fats and sweets 1 ½ 2 ½ 3 4\par Grains and grain products like breads and cereals provide energy, fiber and vitamins. Whole grains have more of these nutrients. One serving equals one of the following:\par Bagel, 1/2 medium; Barley, cooked 1/2 cup; Biscuit, country style 1 medium; Bread, whole wheat, white 1 slice; Cereals, cold or cooked, 1/2 cup; Cornbread, 1 medium piece; Crackers, alfredo, 2; Crackers, saltine, 4; Dinner roll, 1medium; English muffin, ½; Hamburger bun, ½; Muffin, 1 medium; Pancake, 1 medium; Pasta, 1/2 cup cooked; Liv, 1/2 large or 1 small; Popcorn, 1 cup popped; Pretzels, 1 ounce; Rice, white, brown, or wild, 1/2 cup cooked; Tortillas, corn or flour, 1 medium; Waffle, 1 medium; Wheat germ, 1/4 cup; \par Vegetables are rich sources of potassium, magnesium, and fiber. One serving is 1/2 cup of any of the following cooked vegetables:\par Asparagus, Beans (green, yellow), Beets, Broccoli, [place] Sprouts, Carrots, Cauliflower, Fernando, chicory, mustard and turnip (and other) greens, Corn, Kale, Lima beans, Mixed vegetables, Okra, Parsnips, Peas, green, Potatoes (1/2 medium or 1/2 cup mashed), Pumpkin, Rutabaga, Spaghetti or tomato sauce, Spinach, Squash (zucchini or yellow), Stewed tomatoes, Succotash, Sweet potatoes, Turnips, Yam \par Raw vegetables: Carrots,1/2 cup; Celery, 1/2 cup; Lettuce (edwar, loose-leaf, green-leaf), 1 cup; Peppers, 1/2 cup; Spinach, 1 cup; Tomato, 1/2\par Fruits and fruit juices are important sources of potassium and magnesium. Fruits also contain fiber and are low in sodium and fat. One serving equals:\par Any fruit juice, # cup (6 ounces); Canned or frozen fruit, ½ cup (includes applesauce); Dried fruit, ¼ cup; \par Fresh fruit:\par Apple, 1 medium; Apricots, 2 medium; Banana, 1 medium; Berries, 1/2 cup; Melon, 1 wedge, or 1/2 cup; Cherries, 10; Grapefruit, 1/2; Grapes, 15; Kiwi, 1 medium; Spry, 1/2 small; Nectarine, 1 medium; Orange, 1 medium; Peach, 1 medium; Pear, 1 medium; Pineapple, 1/2 cup; Plums, 2 medium; Tangerine, 1 large\par Dairy foods provide protein and calcium. Use low-fat or nonfat dairy products to cut down on fat. One serving equals:\par Skim milk, 1 cup (8 ounces); 1% low fat milk, 1 cup (8 ounces); 2% low fat milk, 1 cup (8 ounces) nonfat dry milk powder (1/3 cup); Low-fat cottage cheese, 1 cup (8 ounces); Parmesan cheese, 1 tablespoon; Mozzarella cheese, part skim, 1/4 cup (1 ounce); Low-fat cheddar cheese, 11/2 ounces; Ricotta cheese, part skim milk or nonfat, 1/4 cup (11/2 ounces); Other low fat or nonfat cheeses (11/2 oz.); Low-fat or nonfat yogurt, fruit-flavored or plain, 1 cup (8 ounces)\par Low-fat or nonfat frozen yogurt, 1/2 cup (4 ounces); Note: People who can't digest dairy products can try taking lactase enzyme pills or drops (available at drug and grocery stores) when they eat dairy. There is also milk available with the enzyme already added. Or you can buy lactose-free milk.\par Meat, poultry, and fish are good sources of protein and magnesium. One serving equals:\par Lean meat including beef, veal, or pork, 3 ounces cooked; Skinless, white meat poultry including turkey, chicken, 3 ounces; Fish and shellfish, 3 ounces cooked; Low-fat luncheon meats, 1 ounce; Egg, 1 medium; Tofu, 3 ounces\par Note: Three ounces of cooked meat is about the size of a deck of cards.\par Nuts, seeds, and legumes are rich sources of energy, magnesium, potassium, protein and fiber. Nuts and seeds are also high in fat, so portions should be small.\par Almonds, 1/3 cup; Beans such as kidney, grossman, and navy, 1/2 cup cooked; Chickpeas and lentils, 1/2 cup cooked; Cashews, 1/3 cup; Filberts, 1/3 cup; Mixed nuts, 1/3 cup; Peanut butter, 2 tablespoons; Peanuts, 1/3 cup; Sesame seeds, 2 tablespoons; Sunflower seeds, 2 tablespoons; Tofu, regular, 3 ounces; Walnuts, 1/3 cup \par Following the above diet will give you about 27% fat in your diet. The goal is to have 30% or less of the calories you eat each day be from fat. To meet that goal, do not eat more than 2-3 servings daily of added fat. Also try to limit sweets. One serving equals:\par Butter or margarine, 1 teaspoon; Mayonnaise, 1 teaspoon; Low-fat mayonnaise, 1 tablespoon; Salad dressing, 1 tablespoon; Low-fat salad dressing, 2 tablespoons; Oil, 1 teaspoon (use olive, canola, safflower, or other vegetable oils); Candy, hard, 3 pieces; Jelly or jam, 1 tablespoon); Jell-O, 1/2 cup; Jelly beans, 1/2 ounce; Maple syrup, 1 tablespoon; Popsicle, 1; Sherbet or nonfat or low-fat frozen yogurt, 1/2 cup; Sugar, 1 tablespoon; Sugared lemonade or fruit punch, 1 cup (8 ounces); Note: Try diet fruit-flavored gelatin or frozen, canned, or fresh fruit for dessert.\par \par Small amounts of alcohol may have benefits to the heart and blood pressure. However, excess use of alcohol can cause damage to the brain, liver and other organs. It can lead to high blood pressure. Drinking more than two drinks (15 ml)  every day can raise your blood pressure. 15 ml of alcohol equals: \par • one 12-ounce bottle of beer \par • a half glass (5 ounces) of wine \par • 1 ounce (one shot) of 100 proof hard liquor\par

## 2020-10-30 ENCOUNTER — TRANSCRIPTION ENCOUNTER (OUTPATIENT)
Age: 70
End: 2020-10-30

## 2020-10-30 LAB — APO LP(A) SERPL-MCNC: 112.8 NMOL/L

## 2020-11-04 ENCOUNTER — APPOINTMENT (OUTPATIENT)
Dept: PULMONOLOGY | Facility: CLINIC | Age: 70
End: 2020-11-04
Payer: COMMERCIAL

## 2020-11-04 ENCOUNTER — NON-APPOINTMENT (OUTPATIENT)
Age: 70
End: 2020-11-04

## 2020-11-04 VITALS
HEART RATE: 75 BPM | DIASTOLIC BLOOD PRESSURE: 90 MMHG | SYSTOLIC BLOOD PRESSURE: 150 MMHG | BODY MASS INDEX: 28.35 KG/M2 | HEIGHT: 63 IN | TEMPERATURE: 96.7 F | WEIGHT: 160 LBS | OXYGEN SATURATION: 97 % | RESPIRATION RATE: 12 BRPM

## 2020-11-04 PROCEDURE — 99214 OFFICE O/P EST MOD 30 MIN: CPT | Mod: 25

## 2020-11-04 PROCEDURE — 99072 ADDL SUPL MATRL&STAF TM PHE: CPT

## 2020-11-04 NOTE — PHYSICAL EXAM
[General Appearance - Well Developed] : well developed [Normal Appearance] : normal appearance [Well Groomed] : well groomed [General Appearance - Well Nourished] : well nourished [No Deformities] : no deformities [General Appearance - In No Acute Distress] : no acute distress [Normal Conjunctiva] : the conjunctiva exhibited no abnormalities [Eyelids - No Xanthelasma] : the eyelids demonstrated no xanthelasmas [Normal Oropharynx] : normal oropharynx [III] : III [Neck Appearance] : the appearance of the neck was normal [Neck Cervical Mass (___cm)] : no neck mass was observed [Jugular Venous Distention Increased] : there was no jugular-venous distention [Thyroid Diffuse Enlargement] : the thyroid was not enlarged [Thyroid Nodule] : there were no palpable thyroid nodules [Heart Rate And Rhythm] : heart rate was normal and rhythm regular [Heart Sounds] : normal S1 and S2 [Heart Sounds Gallop] : no gallops [Murmurs] : no murmurs [Heart Sounds Pericardial Friction Rub] : no pericardial rub [Respiration, Rhythm And Depth] : normal respiratory rhythm and effort [Auscultation Breath Sounds / Voice Sounds] : lungs were clear to auscultation bilaterally [Lungs Percussion] : the lungs were normal to percussion [Nail Clubbing] : no clubbing of the fingernails [Cyanosis, Localized] : no localized cyanosis [Petechial Hemorrhages (___cm)] : no petechial hemorrhages [] : no ischemic changes [FreeTextEntry1] : arthritic changes hands B

## 2020-11-04 NOTE — ASSESSMENT
[FreeTextEntry1] : obstructive sleep apnea : Doing very well with CPAP, excellent compliance and efficacy.  Benefitting from usage.\par \par Given long term advice about CPAP use.  Call durable medical equipment provider provider if any equipment problems.  Replace interface as per schedule, generally at last every 3 months.  Stressed importance of CPAP compliance.  Return to see me in about 6 months if doing well.\par \par dyspnea: pulmonary function testing normal, has also had cardiac evaluation.  May have mild asthma with subjective improvement after albuterol, unclear.  Reassured for now, use albuterol prn

## 2020-11-04 NOTE — HISTORY OF PRESENT ILLNESS
[FreeTextEntry1] : 10/17/18:  Last seen 2016 for rx of sleep apnea.  Hx fibromyalgia, chronic pain on chronic narcotic analgesics.  Feels more rested when uses CPAP but compliance has never been optimal. On CPAP since 2014, overnight polysomnography 2013 showed Apnea Hypopnea Index = 22.  Recently has come off of Fentanyl patch, which should reduce obstructive sleep apnea.  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 Machine probably fronm feb 2014- may be due for replacement.  Continues to complain needs new interface more than once a month, using Dreamwear nasal, turns out he is boiling the interface to sterilize it.  Told him to stop. \par \par Most recent unattended home sleep testing shows lower AHI now that mostly off narcotic analgesics:  Apnea Hypopnea Index on unattended home sleep testing now 11.9. \par \par 6/5/19: Now appears to be compliant with CPAP, sleeps better with it.  Thinks he is due for new machine- will order.  Advised about use of travel machine.  Recent nasal congestion, allergy sx, itchy eyes, sneezing.\par \par 9/11/19: doing well with new machine. Still has chronic pain on opiates.  Complains of dry mouth and some claustrophobia.  Using Dreamwear under nose mask, not using heated tubing (wants longer than 6 ft).  Compliance today 100%, average >5h, Apnea Hypopnea Index 1.5.  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  \par \par 3/11/2020: Chronic pain on (less?) opiates.  Complaining of restless legs, typical sx during the day, has been treated with pramipexole by another MD, using 1.5 mg with additional 0.25mg if needed.  Also recently using temazepam for sleep.  Reviewing   shows Dr Juni Monsalve has prescribed Sunosi, Modafinil and oxycodone. \par \par 9/23/20: here 35 min late. CPAP compliance downloaded- 100% use >4h, Apnea Hypopnea Index on rx = 1.6, P median 9.0, all good but large leak.  He is aware of this- does not like mask tight because he gets claustrophobic.  On modafinil for excessive daytime somnolence per his neurologist, Sunosi stopped. on oxycodone for pain.  Today c/o dyspnea on exertion past few months.  No smoking decades, tells me had pulmonary function testing 3 yrs ago normal. No wheeze or cough.  Has used albuterol in past, ? prior dx asthma. \par \par 11/4/2020:  CPAP compliance today excellent, Apnea Hypopnea Index <5, 29/30 d use > 4h.  Sleeping better, mildly uncomfortable with CPAP.  Using restoril for sleep, modafinil, analgesics; prescribed by other MDs.  Recent cardiac evaluation OK. Complains of dyspnea, occasionally with cough, relieved by MDI albuterol, using about 1-2 inhalations per week, seems to help.  Went over recent pulmonary function testing with him- normal.

## 2021-03-18 ENCOUNTER — NON-APPOINTMENT (OUTPATIENT)
Age: 71
End: 2021-03-18

## 2021-03-31 ENCOUNTER — APPOINTMENT (OUTPATIENT)
Dept: CV DIAGNOSTICS | Facility: HOSPITAL | Age: 71
End: 2021-03-31
Payer: MEDICARE

## 2021-03-31 ENCOUNTER — OUTPATIENT (OUTPATIENT)
Dept: OUTPATIENT SERVICES | Facility: HOSPITAL | Age: 71
LOS: 1 days | End: 2021-03-31

## 2021-03-31 DIAGNOSIS — I10 ESSENTIAL (PRIMARY) HYPERTENSION: ICD-10-CM

## 2021-03-31 PROCEDURE — 78452 HT MUSCLE IMAGE SPECT MULT: CPT | Mod: 26

## 2021-03-31 PROCEDURE — 93018 CV STRESS TEST I&R ONLY: CPT | Mod: GC

## 2021-03-31 PROCEDURE — 93016 CV STRESS TEST SUPVJ ONLY: CPT | Mod: GC

## 2021-04-21 ENCOUNTER — NON-APPOINTMENT (OUTPATIENT)
Age: 71
End: 2021-04-21

## 2021-05-12 ENCOUNTER — APPOINTMENT (OUTPATIENT)
Dept: CARDIOLOGY | Facility: CLINIC | Age: 71
End: 2021-05-12
Payer: MEDICARE

## 2021-05-12 ENCOUNTER — NON-APPOINTMENT (OUTPATIENT)
Age: 71
End: 2021-05-12

## 2021-05-12 VITALS
DIASTOLIC BLOOD PRESSURE: 71 MMHG | TEMPERATURE: 96.9 F | RESPIRATION RATE: 16 BRPM | WEIGHT: 170 LBS | SYSTOLIC BLOOD PRESSURE: 119 MMHG | HEART RATE: 77 BPM | OXYGEN SATURATION: 95 % | BODY MASS INDEX: 30.12 KG/M2 | HEIGHT: 63 IN

## 2021-05-12 VITALS — HEART RATE: 76 BPM | OXYGEN SATURATION: 93 % | SYSTOLIC BLOOD PRESSURE: 121 MMHG | DIASTOLIC BLOOD PRESSURE: 65 MMHG

## 2021-05-12 VITALS — DIASTOLIC BLOOD PRESSURE: 67 MMHG | OXYGEN SATURATION: 94 % | SYSTOLIC BLOOD PRESSURE: 130 MMHG | HEART RATE: 82 BPM

## 2021-05-12 VITALS — DIASTOLIC BLOOD PRESSURE: 66 MMHG | HEART RATE: 78 BPM | OXYGEN SATURATION: 95 % | SYSTOLIC BLOOD PRESSURE: 123 MMHG

## 2021-05-12 VITALS — OXYGEN SATURATION: 96 % | DIASTOLIC BLOOD PRESSURE: 67 MMHG | HEART RATE: 84 BPM | SYSTOLIC BLOOD PRESSURE: 128 MMHG

## 2021-05-12 VITALS — SYSTOLIC BLOOD PRESSURE: 129 MMHG | HEART RATE: 91 BPM | DIASTOLIC BLOOD PRESSURE: 66 MMHG | OXYGEN SATURATION: 95 %

## 2021-05-12 DIAGNOSIS — K59.09 OTHER CONSTIPATION: ICD-10-CM

## 2021-05-12 PROCEDURE — 93000 ELECTROCARDIOGRAM COMPLETE: CPT

## 2021-05-12 PROCEDURE — 93040 RHYTHM ECG WITH REPORT: CPT | Mod: 59

## 2021-05-12 PROCEDURE — 99215 OFFICE O/P EST HI 40 MIN: CPT

## 2021-05-13 PROBLEM — K59.09 CHRONIC CONSTIPATION: Status: ACTIVE | Noted: 2021-05-13

## 2021-05-13 RX ORDER — TESTOSTERONE 50 MG/5G
50 MG/5GM GEL TRANSDERMAL
Qty: 150 | Refills: 0 | Status: COMPLETED | COMMUNITY
Start: 2021-02-10

## 2021-05-13 RX ORDER — DRONABINOL 5 MG/1
5 CAPSULE ORAL TWICE DAILY
Qty: 180 | Refills: 0 | Status: DISCONTINUED | COMMUNITY
Start: 2021-05-13 | End: 2021-05-13

## 2021-05-13 RX ORDER — ALLOPURINOL 100 MG/1
100 TABLET ORAL
Qty: 90 | Refills: 1 | Status: DISCONTINUED | COMMUNITY
Start: 2020-05-01 | End: 2021-05-13

## 2021-05-13 RX ORDER — LIDOCAINE 5% 700 MG/1
5 PATCH TOPICAL
Qty: 90 | Refills: 0 | Status: DISCONTINUED | COMMUNITY
Start: 2017-03-09 | End: 2021-05-13

## 2021-05-13 RX ORDER — DOCUSATE SODIUM 100 MG/1
100 CAPSULE ORAL
Qty: 90 | Refills: 0 | Status: COMPLETED | COMMUNITY
Start: 2021-01-05

## 2021-05-13 RX ORDER — FLUTICASONE FUROATE AND VILANTEROL TRIFENATATE 100; 25 UG/1; UG/1
100-25 POWDER RESPIRATORY (INHALATION)
Qty: 180 | Refills: 0 | Status: COMPLETED | COMMUNITY
Start: 2021-05-06

## 2021-05-13 RX ORDER — PRAMIPEXOLE DIHYDROCHLORIDE 1.5 MG/1
1.5 TABLET, EXTENDED RELEASE ORAL
Qty: 90 | Refills: 0 | Status: DISCONTINUED | COMMUNITY
Start: 2020-06-23 | End: 2021-05-13

## 2021-05-13 RX ORDER — AMOXICILLIN AND CLAVULANATE POTASSIUM 875; 125 MG/1; MG/1
875-125 TABLET, COATED ORAL
Qty: 28 | Refills: 0 | Status: COMPLETED | COMMUNITY
Start: 2020-11-11

## 2021-05-13 NOTE — DISCUSSION/SUMMARY
[FreeTextEntry1] : \par WEIGHT GOALS:\par \par Category				BMI range - kg/m2	BMI Prime\par Very severely underweight		less than 15		less than 0.60	\par Severely underweight			from 15.0 to 16.0		from 0.60 to 0.64	\par Underweight				from 16.0 to 18.5		from 0.64 to 0.74	\par Normal (healthy weight)			from 18.5 to 25		from 0.74 to 1.0	\par Overweight				from 25 to 30		from 1.0 to 1.2	\par Obese Class I (Moderately obese)		from 30 to 35		from 1.2 to 1.4	\par Obese Class II (Severely obese)		from 35 to 40		from 1.4 to 1.6	\par Obese Class III (Very severely obese)	over 40			over 1.6	\par \par Your current weight is 170 lbs (164 lbs on 10/28/2020; 168 lbs on 5/29/19; 163 lbs on 5/23/18). Given current weight and height 5'3", your calculated body mass index (BMI) is 30.1 kg/sqm. Normal BMI is 18.5-25 kg/sqm. Thus current weight is in the obese class I category. Abdominal waist circumference is measured at the level of the umbilicus and is thus not a pants waist measurement. Your current abdominal waist circumference is 43.5 in (41 in on 10/28/020; 39.5 in on 5/23/18). Normal abdominal waist circumference is < 32 inches for women and < 37 inches for men\par \par DIETARY SALT (SODIUM); DASH DIET AND BLOOD PRESSURE:\par To decrease the sodium in your diet: \par · Use fresh vegetables and foods as much as possible.\par · Avoid canned and processed foods. Cured meats such as rodriguez, ham, and sausages are high in salt.\par · Try using different herbs and spices in your cooking instead of salt.\par · In restaurants, avoid foods with sauces, cheese, and cured meats. Ask for low-sodium choices.\par To get more potassium in your diet, eat:\par · Bananas, fresh or dried apricots, peaches, citrus fruits, melons\par · Cauliflower, broccoli, tomatoes, carrots, raw spinach, beet greens, potatoes\par To get more magnesium in your diet, eat:\par · Whole grain foods, leafy green vegetables, dried fruits\par • Fish and seafood, poultry \par To get more calcium in your diet, eat:\par · Nonfat milk, yogurt, and low-fat cheeses \par · [place] and sardines\par · Cooked dried beans\par · Broccoli, kale, and bok filiberto\par · Tofu or soybean curd\par DASH stands for "dietary approaches to stop hypertension." The DASH diet is low in total and saturated fat. It is rich in fruits, vegetables, and low-fat dairy foods. The diet allows you to get natural fiber, calcium, and magnesium from food. It prevents or lowers high blood pressure. It can also help lower cholesterol in your blood. \par Don't change how you eat all at once. It's much more likely that you'll succeed if you make only one or two small changes at a time. Wait until those changes are a habit, then make a couple more changes. Some good starting steps include: \par Add one serving of vegetables to your meals at lunch and dinner. This is an easy way to help you get more vegetables in your diet. \par Have a piece of fruit as an afternoon or after-dinner snack. One glass of juice at breakfast is not enough fruit in your diet. \par Use half your usual amount of butter, margarine, or salad dressing. \par Buy nonfat salad dressing or nonfat sour cream.\par Follow this guide to select your menu of meals. The number of calories we want you to eat each day will tell you how many servings you can choose from each food group.\par Calories: 1600 2100 2600 3100  Servings Grains 6 7 ½ 10 ½ 12 ½  Vegetables 	 4 4 ½ 5 6 Fruit 4 5 5 6 Dairy (low-fat) 2 ½ 3 3 3 ½  Meat, poultry, fish ½ 1 ½ 2 2 ½  Nuts, seeds ½ ½ ½ 1 Fats and sweets 1 ½ 2 ½ 3 4\par Grains and grain products like breads and cereals provide energy, fiber and vitamins. Whole grains have more of these nutrients. One serving equals one of the following:\par Bagel, 1/2 medium; Barley, cooked 1/2 cup; Biscuit, country style 1 medium; Bread, whole wheat, white 1 slice; Cereals, cold or cooked, 1/2 cup; Cornbread, 1 medium piece; Crackers, alfredo, 2; Crackers, saltine, 4; Dinner roll, 1medium; English muffin, ½; Hamburger bun, ½; Muffin, 1 medium; Pancake, 1 medium; Pasta, 1/2 cup cooked; Liv, 1/2 large or 1 small; Popcorn, 1 cup popped; Pretzels, 1 ounce; Rice, white, brown, or wild, 1/2 cup cooked; Tortillas, corn or flour, 1 medium; Waffle, 1 medium; Wheat germ, 1/4 cup; \par Vegetables are rich sources of potassium, magnesium, and fiber. One serving is 1/2 cup of any of the following cooked vegetables:\par Asparagus, Beans (green, yellow), Beets, Broccoli, [place] Sprouts, Carrots, Cauliflower, Fernando, chicory, mustard and turnip (and other) greens, Corn, Kale, Lima beans, Mixed vegetables, Okra, Parsnips, Peas, green, Potatoes (1/2 medium or 1/2 cup mashed), Pumpkin, Rutabaga, Spaghetti or tomato sauce, Spinach, Squash (zucchini or yellow), Stewed tomatoes, Succotash, Sweet potatoes, Turnips, Yam \par Raw vegetables: Carrots,1/2 cup; Celery, 1/2 cup; Lettuce (edwar, loose-leaf, green-leaf), 1 cup; Peppers, 1/2 cup; Spinach, 1 cup; Tomato, 1/2\par Fruits and fruit juices are important sources of potassium and magnesium. Fruits also contain fiber and are low in sodium and fat. One serving equals:\par Any fruit juice, # cup (6 ounces); Canned or frozen fruit, ½ cup (includes applesauce); Dried fruit, ¼ cup; \par Fresh fruit:\par Apple, 1 medium; Apricots, 2 medium; Banana, 1 medium; Berries, 1/2 cup; Melon, 1 wedge, or 1/2 cup; Cherries, 10; Grapefruit, 1/2; Grapes, 15; Kiwi, 1 medium; Dereck, 1/2 small; Nectarine, 1 medium; Orange, 1 medium; Peach, 1 medium; Pear, 1 medium; Pineapple, 1/2 cup; Plums, 2 medium; Tangerine, 1 large\par Dairy foods provide protein and calcium. Use low-fat or nonfat dairy products to cut down on fat. One serving equals:\par Skim milk, 1 cup (8 ounces); 1% low fat milk, 1 cup (8 ounces); 2% low fat milk, 1 cup (8 ounces) nonfat dry milk powder (1/3 cup); Low-fat cottage cheese, 1 cup (8 ounces); Parmesan cheese, 1 tablespoon; Mozzarella cheese, part skim, 1/4 cup (1 ounce); Low-fat cheddar cheese, 11/2 ounces; Ricotta cheese, part skim milk or nonfat, 1/4 cup (11/2 ounces); Other low fat or nonfat cheeses (11/2 oz.); Low-fat or nonfat yogurt, fruit-flavored or plain, 1 cup (8 ounces)\par Low-fat or nonfat frozen yogurt, 1/2 cup (4 ounces); Note: People who can't digest dairy products can try taking lactase enzyme pills or drops (available at drug and grocery stores) when they eat dairy. There is also milk available with the enzyme already added. Or you can buy lactose-free milk.\par Meat, poultry, and fish are good sources of protein and magnesium. One serving equals:\par Lean meat including beef, veal, or pork, 3 ounces cooked; Skinless, white meat poultry including turkey, chicken, 3 ounces; Fish and shellfish, 3 ounces cooked; Low-fat luncheon meats, 1 ounce; Egg, 1 medium; Tofu, 3 ounces\par Note: Three ounces of cooked meat is about the size of a deck of cards.\par Nuts, seeds, and legumes are rich sources of energy, magnesium, potassium, protein and fiber. Nuts and seeds are also high in fat, so portions should be small.\par Almonds, 1/3 cup; Beans such as kidney, grossman, and navy, 1/2 cup cooked; Chickpeas and lentils, 1/2 cup cooked; Cashews, 1/3 cup; Filberts, 1/3 cup; Mixed nuts, 1/3 cup; Peanut butter, 2 tablespoons; Peanuts, 1/3 cup; Sesame seeds, 2 tablespoons; Sunflower seeds, 2 tablespoons; Tofu, regular, 3 ounces; Walnuts, 1/3 cup \par Following the above diet will give you about 27% fat in your diet. The goal is to have 30% or less of the calories you eat each day be from fat. To meet that goal, do not eat more than 2-3 servings daily of added fat. Also try to limit sweets. One serving equals:\par Butter or margarine, 1 teaspoon; Mayonnaise, 1 teaspoon; Low-fat mayonnaise, 1 tablespoon; Salad dressing, 1 tablespoon; Low-fat salad dressing, 2 tablespoons; Oil, 1 teaspoon (use olive, canola, safflower, or other vegetable oils); Candy, hard, 3 pieces; Jelly or jam, 1 tablespoon); Jell-O, 1/2 cup; Jelly beans, 1/2 ounce; Maple syrup, 1 tablespoon; Popsicle, 1; Sherbet or nonfat or low-fat frozen yogurt, 1/2 cup; Sugar, 1 tablespoon; Sugared lemonade or fruit punch, 1 cup (8 ounces); Note: Try diet fruit-flavored gelatin or frozen, canned, or fresh fruit for dessert.\par \par Small amounts of alcohol may have benefits to the heart and blood pressure. However, excess use of alcohol can cause damage to the brain, liver and other organs. It can lead to high blood pressure. Drinking more than two drinks (15 ml)  every day can raise your blood pressure. 15 ml of alcohol equals: \par • one 12-ounce bottle of beer \par • a half glass (5 ounces) of wine \par • 1 ounce (one shot) of 100 proof hard liquor\par
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
## 2021-05-13 NOTE — PHYSICAL EXAM
[Prolonged Exp Time] : with normal expiratory time [Increased E/I Ratio] : with normal expiratory/inspiratory ratio  [FreeTextEntry1] : ankle circumf 8.25 in right and 8.0 in left (previously 8 in right, 7.75 in left [Apical Thrill] : no thrill palpable at the apex [S3] : no S3 [S4] : no S4 [Click] : no click [Pericardial Rub] : no pericardial rub [Right Carotid Bruit] : no bruit heard over the right carotid [Left Carotid Bruit] : no bruit heard over the left carotid [Right Femoral Bruit] : no bruit heard over the right femoral artery [Left Femoral Bruit] : no bruit heard over the left femoral artery [Rt] : no varicose veins of the right leg [Lt] : no varicose veins of the left leg

## 2021-05-13 NOTE — REASON FOR VISIT
[Hyperlipidemia] : hyperlipidemia [Hypertension] : hypertension [CV Risk Factors and Non-Cardiac Disease] : CV risk factors and non-cardiac disease

## 2021-05-13 NOTE — ADDENDUM
[FreeTextEntry1] : I spent 60 min face to face time with the patient from 12:00 to 13:00 on 5/12/21. Thirty minutes were devoted to  patient counseling at outlined above in the End of Visit section. Prior to this visit, I reviewed office pulmonary records. I reviewed labs done 3/10/21

## 2021-05-24 ENCOUNTER — APPOINTMENT (OUTPATIENT)
Dept: PULMONOLOGY | Facility: CLINIC | Age: 71
End: 2021-05-24
Payer: MEDICARE

## 2021-05-24 VITALS
HEIGHT: 63 IN | SYSTOLIC BLOOD PRESSURE: 151 MMHG | WEIGHT: 173 LBS | OXYGEN SATURATION: 98 % | TEMPERATURE: 97 F | BODY MASS INDEX: 30.65 KG/M2 | DIASTOLIC BLOOD PRESSURE: 73 MMHG | HEART RATE: 86 BPM | RESPIRATION RATE: 12 BRPM

## 2021-05-24 DIAGNOSIS — M48.00 SPINAL STENOSIS, SITE UNSPECIFIED: ICD-10-CM

## 2021-05-24 PROCEDURE — 99214 OFFICE O/P EST MOD 30 MIN: CPT

## 2021-05-24 NOTE — HISTORY OF PRESENT ILLNESS
[FreeTextEntry1] : 10/17/18:  Last seen 2016 for rx of sleep apnea.  Hx fibromyalgia, chronic pain on chronic narcotic analgesics.  Feels more rested when uses CPAP but compliance has never been optimal. On CPAP since 2014, overnight polysomnography 2013 showed Apnea Hypopnea Index = 22.  Recently has come off of Fentanyl patch, which should reduce obstructive sleep apnea.  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 Machine probably fronm feb 2014- may be due for replacement.  Continues to complain needs new interface more than once a month, using Dreamwear nasal, turns out he is boiling the interface to sterilize it.  Told him to stop. \par \par Most recent unattended home sleep testing shows lower AHI now that mostly off narcotic analgesics:  Apnea Hypopnea Index on unattended home sleep testing now 11.9. \par \par 6/5/19: Now appears to be compliant with CPAP, sleeps better with it.  Thinks he is due for new machine- will order.  Advised about use of travel machine.  Recent nasal congestion, allergy sx, itchy eyes, sneezing.\par \par 9/11/19: doing well with new machine. Still has chronic pain on opiates.  Complains of dry mouth and some claustrophobia.  Using Dreamwear under nose mask, not using heated tubing (wants longer than 6 ft).  Compliance today 100%, average >5h, Apnea Hypopnea Index 1.5.  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  \par \par 3/11/2020: Chronic pain on (less?) opiates.  Complaining of restless legs, typical sx during the day, has been treated with pramipexole by another MD, using 1.5 mg with additional 0.25mg if needed.  Also recently using temazepam for sleep.  Reviewing   shows Dr Juni Monsalve has prescribed Sunosi, Modafinil and oxycodone. \par \par 9/23/20: here 35 min late. CPAP compliance downloaded- 100% use >4h, Apnea Hypopnea Index on rx = 1.6, P median 9.0, all good but large leak.  He is aware of this- does not like mask tight because he gets claustrophobic.  On modafinil for excessive daytime somnolence per his neurologist, Chris stopped. on oxycodone for pain.  Today c/o dyspnea on exertion past few months.  No smoking decades, tells me had pulmonary function testing 3 yrs ago normal. No wheeze or cough.  Has used albuterol in past, ? prior dx asthma. \par \par 11/4/2020:  CPAP compliance today excellent, Apnea Hypopnea Index <5, 29/30 d use > 4h.  Sleeping better, mildly uncomfortable with CPAP.  Using restoril for sleep, modafinil, analgesics; prescribed by other MDs.  Recent cardiac evaluation OK. Complains of dyspnea, occasionally with cough, relieved by MDI albuterol, using about 1-2 inhalations per week, seems to help.  Went over recent pulmonary function testing with him- normal. \par \par 5/24/21:  since last seen has had cardiac visit (? mild diastolic dysfunction), had CT chest because of ?low O2 sats (not here).  c/o dyspnea.  CT chest showed atelectasis and small nodules.  He was given Breo inhaler, not sure it has had subjective benefit- note normal pulmonary function testing in past.\par \par Using CPAP, stopeed for few weeks when on vacation.  Download today shows Apnea Hypopnea Index 11.7 on rx, mostly central, AHI highest on days with leak. Felt worse when not using this.  \par Weight about same.  Remains on opiate analgesics.

## 2021-05-24 NOTE — PHYSICAL EXAM
[General Appearance - Well Developed] : well developed [Normal Appearance] : normal appearance [Well Groomed] : well groomed [General Appearance - Well Nourished] : well nourished [No Deformities] : no deformities [General Appearance - In No Acute Distress] : no acute distress [Normal Oropharynx] : normal oropharynx [III] : III [FreeTextEntry1] : no tonsils; boggy nasal mucosa [Neck Appearance] : the appearance of the neck was normal [Neck Cervical Mass (___cm)] : no neck mass was observed [Jugular Venous Distention Increased] : there was no jugular-venous distention [Thyroid Diffuse Enlargement] : the thyroid was not enlarged [Thyroid Nodule] : there were no palpable thyroid nodules [Heart Rate And Rhythm] : heart rate was normal and rhythm regular [Heart Sounds] : normal S1 and S2 [Heart Sounds Gallop] : no gallops [Murmurs] : no murmurs [Heart Sounds Pericardial Friction Rub] : no pericardial rub [] : no respiratory distress [Respiration, Rhythm And Depth] : normal respiratory rhythm and effort [Auscultation Breath Sounds / Voice Sounds] : lungs were clear to auscultation bilaterally [Lungs Percussion] : the lungs were normal to percussion [Abnormal Walk] : normal gait [Nail Clubbing] : no clubbing  or cyanosis of the fingernails [Musculoskeletal - Swelling] : no joint swelling seen [Motor Tone] : muscle strength and tone were normal [1+ Pitting] : 1+  pitting

## 2021-05-24 NOTE — ASSESSMENT
[FreeTextEntry1] : 1) sleep disordered breathing : mostly central, opiates probably contributing.  OK on CPAP, needs better fitting mask\par \par 2) dyspnea: doubt Breo is helping, would stop for now, normal pulmonary function testing in past.  Unclear to me how much cardiac disease he has, likely deconditioned\par \par 3) atelectasis on CT: assume this is from opiate induced hypoventilation as well.  agree w use of incentive spirometer.  may need repeat 1 yr for nodules (former smoker but not high risk)\par \par f/u 4 months

## 2021-05-25 ENCOUNTER — NON-APPOINTMENT (OUTPATIENT)
Age: 71
End: 2021-05-25

## 2021-05-25 DIAGNOSIS — J98.11 ATELECTASIS: ICD-10-CM

## 2021-09-13 ENCOUNTER — APPOINTMENT (OUTPATIENT)
Dept: PULMONOLOGY | Facility: CLINIC | Age: 71
End: 2021-09-13
Payer: MEDICARE

## 2021-09-13 VITALS
OXYGEN SATURATION: 98 % | RESPIRATION RATE: 12 BRPM | TEMPERATURE: 97.8 F | BODY MASS INDEX: 32.25 KG/M2 | WEIGHT: 182 LBS | HEIGHT: 63 IN | HEART RATE: 73 BPM | DIASTOLIC BLOOD PRESSURE: 87 MMHG | SYSTOLIC BLOOD PRESSURE: 165 MMHG

## 2021-09-13 DIAGNOSIS — G47.33 OBSTRUCTIVE SLEEP APNEA (ADULT) (PEDIATRIC): ICD-10-CM

## 2021-09-13 DIAGNOSIS — R93.89 ABNORMAL FINDINGS ON DIAGNOSTIC IMAGING OF OTHER SPECIFIED BODY STRUCTURES: ICD-10-CM

## 2021-09-13 PROCEDURE — 99214 OFFICE O/P EST MOD 30 MIN: CPT

## 2021-09-13 NOTE — HISTORY OF PRESENT ILLNESS
[FreeTextEntry1] : 10/17/18:  Last seen 2016 for rx of sleep apnea.  Hx fibromyalgia, chronic pain on chronic narcotic analgesics.  Feels more rested when uses CPAP but compliance has never been optimal. On CPAP since 2014, overnight polysomnography 2013 showed Apnea Hypopnea Index = 22.  Recently has come off of Fentanyl patch, which should reduce obstructive sleep apnea.  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 Machine probably fronm feb 2014- may be due for replacement.  Continues to complain needs new interface more than once a month, using Dreamwear nasal, turns out he is boiling the interface to sterilize it.  Told him to stop. \par \par Most recent unattended home sleep testing shows lower AHI now that mostly off narcotic analgesics:  Apnea Hypopnea Index on unattended home sleep testing now 11.9. \par \par 6/5/19: Now appears to be compliant with CPAP, sleeps better with it.  Thinks he is due for new machine- will order.  Advised about use of travel machine.  Recent nasal congestion, allergy sx, itchy eyes, sneezing.\par \par 9/11/19: doing well with new machine. Still has chronic pain on opiates.  Complains of dry mouth and some claustrophobia.  Using Dreamwear under nose mask, not using heated tubing (wants longer than 6 ft).  Compliance today 100%, average >5h, Apnea Hypopnea Index 1.5.  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  \par \par 3/11/2020: Chronic pain on (less?) opiates.  Complaining of restless legs, typical sx during the day, has been treated with pramipexole by another MD, using 1.5 mg with additional 0.25mg if needed.  Also recently using temazepam for sleep.  Reviewing   shows Dr Juni Monsalve has prescribed Sunosi, Modafinil and oxycodone. \par \par 9/23/20: here 35 min late. CPAP compliance downloaded- 100% use >4h, Apnea Hypopnea Index on rx = 1.6, P median 9.0, all good but large leak.  He is aware of this- does not like mask tight because he gets claustrophobic.  On modafinil for excessive daytime somnolence per his neurologist, Sunremy stopped. on oxycodone for pain.  Today c/o dyspnea on exertion past few months.  No smoking decades, tells me had pulmonary function testing 3 yrs ago normal. No wheeze or cough.  Has used albuterol in past, ? prior dx asthma. \par \par 11/4/2020:  CPAP compliance today excellent, Apnea Hypopnea Index <5, 29/30 d use > 4h.  Sleeping better, mildly uncomfortable with CPAP.  Using restoril for sleep, modafinil, analgesics; prescribed by other MDs.  Recent cardiac evaluation OK. Complains of dyspnea, occasionally with cough, relieved by MDI albuterol, using about 1-2 inhalations per week, seems to help.  Went over recent pulmonary function testing with him- normal. \par \par 5/24/21:  since last seen has had cardiac visit (? mild diastolic dysfunction), had CT chest because of ?low O2 sats (not here).  c/o dyspnea.  CT chest showed atelectasis and small nodules.  He was given Breo inhaler, not sure it has had subjective benefit- note normal pulmonary function testing in past.\par \par Using CPAP, stopped for few weeks when on vacation.  Download today shows Apnea Hypopnea Index 11.7 on rx, mostly central, AHI highest on days with leak. Felt worse when not using this.  \par Weight about same.  Remains on opiate analgesics. \par \par 9/13/21:  Since last visit using CPAP regularly.  Download today:  90% use >4h past 30 d, Apnea Hypopnea Index on rx = 7.5, high mask leak.  both central and obstructive events.  Remains on opiates for pain control.   Jodie [place] to appointment today.\par \par Brought copy of CT report from S dated 8/12/21: reports "ground glass opacities from 6/16 CT have resolved".  Most c/w resolved pneumonia (?Covid-19, although has been vaccinated). \par \par Reports nasal congestion, Fall allergies. \par \par

## 2021-09-13 NOTE — ASSESSMENT
[FreeTextEntry1] : Doing  well with CPAP, excellent compliance and fair efficacy.  Benefiting from usage.  Mask fit suboptimal but patient states he cannot get mask fit better.  Element of central as well as obstructive apnea, related to opiate use. \par \par Given long term advice about CPAP use.  Call durable medical equipment provider if any equipment problems.  Replace interface as per schedule, generally at least every 3 months.  Stressed importance of CPAP compliance.  Return to see me in about 8 months if doing well. \par \par CT reports suggest he had pneumonia, now resolved- reassured.

## 2021-09-13 NOTE — PHYSICAL EXAM
[General Appearance - Well Developed] : well developed [Normal Appearance] : normal appearance [Well Groomed] : well groomed [General Appearance - Well Nourished] : well nourished [No Deformities] : no deformities [General Appearance - In No Acute Distress] : no acute distress [Normal Oropharynx] : normal oropharynx [Erythema] : erythema of the pharynx [III] : III [FreeTextEntry1] : no tonsils; boggy nasal mucosa [Neck Appearance] : the appearance of the neck was normal [Neck Cervical Mass (___cm)] : no neck mass was observed [Jugular Venous Distention Increased] : there was no jugular-venous distention [Thyroid Diffuse Enlargement] : the thyroid was not enlarged [Thyroid Nodule] : there were no palpable thyroid nodules [Heart Rate And Rhythm] : heart rate was normal and rhythm regular [Heart Sounds] : normal S1 and S2 [Heart Sounds Gallop] : no gallops [Murmurs] : no murmurs [Heart Sounds Pericardial Friction Rub] : no pericardial rub [] : no respiratory distress [Auscultation Breath Sounds / Voice Sounds] : lungs were clear to auscultation bilaterally [Abnormal Walk] : normal gait [Nail Clubbing] : no clubbing  or cyanosis of the fingernails [Musculoskeletal - Swelling] : no joint swelling seen [Motor Tone] : muscle strength and tone were normal

## 2021-10-17 ENCOUNTER — RX RENEWAL (OUTPATIENT)
Age: 71
End: 2021-10-17

## 2021-11-16 NOTE — REASON FOR VISIT
[CV Risk Factors and Non-Cardiac Disease] : CV risk factors and non-cardiac disease [Follow-Up - Clinic] : a clinic follow-up of [Chest Pain] : chest pain [Hyperlipidemia] : hyperlipidemia [Hypertension] : hypertension [Medication Management] : Medication management [Spouse] : spouse

## 2021-11-17 ENCOUNTER — APPOINTMENT (OUTPATIENT)
Dept: CARDIOLOGY | Facility: CLINIC | Age: 71
End: 2021-11-17
Payer: MEDICARE

## 2021-11-17 ENCOUNTER — NON-APPOINTMENT (OUTPATIENT)
Age: 71
End: 2021-11-17

## 2021-11-17 VITALS — HEART RATE: 66 BPM | SYSTOLIC BLOOD PRESSURE: 130 MMHG | OXYGEN SATURATION: 96 % | DIASTOLIC BLOOD PRESSURE: 69 MMHG

## 2021-11-17 VITALS
OXYGEN SATURATION: 97 % | TEMPERATURE: 96.8 F | SYSTOLIC BLOOD PRESSURE: 135 MMHG | BODY MASS INDEX: 31.54 KG/M2 | WEIGHT: 178 LBS | HEART RATE: 71 BPM | DIASTOLIC BLOOD PRESSURE: 71 MMHG | RESPIRATION RATE: 16 BRPM | HEIGHT: 63 IN

## 2021-11-17 VITALS — DIASTOLIC BLOOD PRESSURE: 68 MMHG | SYSTOLIC BLOOD PRESSURE: 122 MMHG | HEART RATE: 70 BPM | OXYGEN SATURATION: 96 %

## 2021-11-17 VITALS — HEART RATE: 66 BPM | DIASTOLIC BLOOD PRESSURE: 70 MMHG | SYSTOLIC BLOOD PRESSURE: 135 MMHG | OXYGEN SATURATION: 99 %

## 2021-11-17 VITALS — HEART RATE: 70 BPM | SYSTOLIC BLOOD PRESSURE: 134 MMHG | DIASTOLIC BLOOD PRESSURE: 72 MMHG | OXYGEN SATURATION: 95 %

## 2021-11-17 PROCEDURE — 93000 ELECTROCARDIOGRAM COMPLETE: CPT

## 2021-11-17 PROCEDURE — 36415 COLL VENOUS BLD VENIPUNCTURE: CPT

## 2021-11-17 PROCEDURE — 93040 RHYTHM ECG WITH REPORT: CPT | Mod: 59

## 2021-11-17 PROCEDURE — 99215 OFFICE O/P EST HI 40 MIN: CPT

## 2021-11-17 RX ORDER — CYCLOBENZAPRINE HYDROCHLORIDE 10 MG/1
10 TABLET, FILM COATED ORAL
Qty: 30 | Refills: 0 | Status: DISCONTINUED | COMMUNITY
Start: 2020-01-27 | End: 2021-11-17

## 2021-11-17 RX ORDER — EZETIMIBE 10 MG/1
10 TABLET ORAL
Qty: 90 | Refills: 3 | Status: DISCONTINUED | COMMUNITY
Start: 2021-05-25 | End: 2021-11-17

## 2021-11-17 RX ORDER — PRAVASTATIN SODIUM 20 MG/1
20 TABLET ORAL
Qty: 90 | Refills: 3 | Status: DISCONTINUED | COMMUNITY
Start: 2021-05-13 | End: 2021-11-17

## 2021-11-18 LAB
25(OH)D3 SERPL-MCNC: 37.1 NG/ML
ALBUMIN SERPL ELPH-MCNC: 4.9 G/DL
ALP BLD-CCNC: 86 U/L
ALT SERPL-CCNC: 25 U/L
ANION GAP SERPL CALC-SCNC: 19 MMOL/L
APPEARANCE: CLEAR
AST SERPL-CCNC: 30 U/L
BACTERIA: NEGATIVE
BASOPHILS # BLD AUTO: 0.09 K/UL
BASOPHILS NFR BLD AUTO: 1 %
BILIRUB SERPL-MCNC: 0.2 MG/DL
BILIRUBIN URINE: NEGATIVE
BLOOD URINE: NEGATIVE
BUN SERPL-MCNC: 26 MG/DL
CALCIUM SERPL-MCNC: 9.7 MG/DL
CHLORIDE SERPL-SCNC: 97 MMOL/L
CHOLEST SERPL-MCNC: 218 MG/DL
CK SERPL-CCNC: 143 U/L
CO2 SERPL-SCNC: 25 MMOL/L
COLOR: YELLOW
CREAT SERPL-MCNC: 1.2 MG/DL
CREAT SPEC-SCNC: 90 MG/DL
CRP SERPL HS-MCNC: 4.07 MG/L
EOSINOPHIL # BLD AUTO: 0.21 K/UL
EOSINOPHIL NFR BLD AUTO: 2.3 %
ESTIMATED AVERAGE GLUCOSE: 117 MG/DL
GLUCOSE QUALITATIVE U: NEGATIVE
GLUCOSE SERPL-MCNC: 58 MG/DL
HBA1C MFR BLD HPLC: 5.7 %
HCT VFR BLD CALC: 45.7 %
HDLC SERPL-MCNC: 68 MG/DL
HGB BLD-MCNC: 14.6 G/DL
HYALINE CASTS: 2 /LPF
IMM GRANULOCYTES NFR BLD AUTO: 0.3 %
IRON SATN MFR SERPL: 8 %
IRON SERPL-MCNC: 31 UG/DL
KETONES URINE: NEGATIVE
LDLC SERPL CALC-MCNC: 125 MG/DL
LDLC SERPL DIRECT ASSAY-MCNC: 129 MG/DL
LEUKOCYTE ESTERASE URINE: NEGATIVE
LYMPHOCYTES # BLD AUTO: 1.6 K/UL
LYMPHOCYTES NFR BLD AUTO: 17.5 %
MAGNESIUM SERPL-MCNC: 2.1 MG/DL
MAN DIFF?: NORMAL
MCHC RBC-ENTMCNC: 30.2 PG
MCHC RBC-ENTMCNC: 31.9 GM/DL
MCV RBC AUTO: 94.4 FL
MICROALBUMIN 24H UR DL<=1MG/L-MCNC: 8 MG/DL
MICROALBUMIN/CREAT 24H UR-RTO: 88 MG/G
MICROSCOPIC-UA: NORMAL
MONOCYTES # BLD AUTO: 1.28 K/UL
MONOCYTES NFR BLD AUTO: 14 %
NEUTROPHILS # BLD AUTO: 5.92 K/UL
NEUTROPHILS NFR BLD AUTO: 64.9 %
NITRITE URINE: NEGATIVE
NONHDLC SERPL-MCNC: 150 MG/DL
PH URINE: 6.5
PLATELET # BLD AUTO: 261 K/UL
POTASSIUM SERPL-SCNC: 4.1 MMOL/L
PROT SERPL-MCNC: 7.6 G/DL
PROTEIN URINE: NORMAL
RBC # BLD: 4.84 M/UL
RBC # FLD: 18.3 %
RED BLOOD CELLS URINE: 1 /HPF
SODIUM SERPL-SCNC: 140 MMOL/L
SPECIFIC GRAVITY URINE: 1.02
SQUAMOUS EPITHELIAL CELLS: 0 /HPF
TIBC SERPL-MCNC: 385 UG/DL
TRIGL SERPL-MCNC: 124 MG/DL
UIBC SERPL-MCNC: 354 UG/DL
URATE SERPL-MCNC: 6.4 MG/DL
UROBILINOGEN URINE: NORMAL
WBC # FLD AUTO: 9.13 K/UL
WHITE BLOOD CELLS URINE: 0 /HPF

## 2021-11-18 RX ORDER — METHYLPREDNISOLONE 4 MG/1
4 TABLET ORAL
Qty: 21 | Refills: 0 | Status: COMPLETED | COMMUNITY
Start: 2021-06-23

## 2021-11-18 RX ORDER — CLOBETASOL PROPIONATE 0.5 MG/G
0.05 OINTMENT TOPICAL
Qty: 60 | Refills: 0 | Status: COMPLETED | COMMUNITY
Start: 2021-09-29

## 2021-11-18 RX ORDER — SULFAMETHOXAZOLE AND TRIMETHOPRIM 800; 160 MG/1; MG/1
800-160 TABLET ORAL
Qty: 14 | Refills: 0 | Status: COMPLETED | COMMUNITY
Start: 2021-06-23

## 2021-11-18 RX ORDER — FLUTICASONE FUROATE AND VILANTEROL TRIFENATATE 100; 25 UG/1; UG/1
100-25 POWDER RESPIRATORY (INHALATION) DAILY
Qty: 1 | Refills: 3 | Status: DISCONTINUED | COMMUNITY
Start: 2021-05-13 | End: 2021-11-18

## 2021-11-18 NOTE — DISCUSSION/SUMMARY
[Patient] : the patient [Minutes Spent: ___] : for [unfilled] ~Uminutes [With Me] : with me [___ Month(s)] : in [unfilled] month(s) [FreeTextEntry1] : \par WEIGHT GOALS:\par \par Category				BMI range - kg/m2	BMI Prime\par Very severely underweight		less than 15		less than 0.60	\par Severely underweight			from 15.0 to 16.0		from 0.60 to 0.64	\par Underweight				from 16.0 to 18.5		from 0.64 to 0.74	\par Normal (healthy weight)			from 18.5 to 25		from 0.74 to 1.0	\par Overweight				from 25 to 30		from 1.0 to 1.2	\par Obese Class I (Moderately obese)		from 30 to 35		from 1.2 to 1.4	\par Obese Class II (Severely obese)		from 35 to 40		from 1.4 to 1.6	\par Obese Class III (Very severely obese)	over 40			over 1.6	\par \par Your current weight is 178 lbs (170 lbs on 5/12/21; 164 lbs on 10/28/2020; 168 lbs on 5/29/19; 163 lbs on 5/23/18). Given current weight and height 5'3", your calculated body mass index (BMI) is 31.5 kg/sqm. Normal BMI is 18.5-25 kg/sqm. Thus current weight is in the obese class I category. Abdominal waist circumference is measured at the level of the umbilicus and is thus not a pants waist measurement. Your current abdominal waist circumference is 43 in (41 in on 10/28/020; 39.5 in on 5/23/18). Normal abdominal waist circumference is < 32 inches for women and < 37 inches for men\par \par DIETARY SALT (SODIUM); DASH DIET AND BLOOD PRESSURE:\par To decrease the sodium in your diet: \par · Use fresh vegetables and foods as much as possible.\par · Avoid canned and processed foods. Cured meats such as rodriguez, ham, and sausages are high in salt.\par · Try using different herbs and spices in your cooking instead of salt.\par · In restaurants, avoid foods with sauces, cheese, and cured meats. Ask for low-sodium choices.\par To get more potassium in your diet, eat:\par · Bananas, fresh or dried apricots, peaches, citrus fruits, melons\par · Cauliflower, broccoli, tomatoes, carrots, raw spinach, beet greens, potatoes\par To get more magnesium in your diet, eat:\par · Whole grain foods, leafy green vegetables, dried fruits\par • Fish and seafood, poultry \par To get more calcium in your diet, eat:\par · Nonfat milk, yogurt, and low-fat cheeses \par · [place] and sardines\par · Cooked dried beans\par · Broccoli, kale, and bok filiberto\par · Tofu or soybean curd\par DASH stands for "dietary approaches to stop hypertension." The DASH diet is low in total and saturated fat. It is rich in fruits, vegetables, and low-fat dairy foods. The diet allows you to get natural fiber, calcium, and magnesium from food. It prevents or lowers high blood pressure. It can also help lower cholesterol in your blood. \par Don't change how you eat all at once. It's much more likely that you'll succeed if you make only one or two small changes at a time. Wait until those changes are a habit, then make a couple more changes. Some good starting steps include: \par Add one serving of vegetables to your meals at lunch and dinner. This is an easy way to help you get more vegetables in your diet. \par Have a piece of fruit as an afternoon or after-dinner snack. One glass of juice at breakfast is not enough fruit in your diet. \par Use half your usual amount of butter, margarine, or salad dressing. \par Buy nonfat salad dressing or nonfat sour cream.\par Follow this guide to select your menu of meals. The number of calories we want you to eat each day will tell you how many servings you can choose from each food group.\par Calories: 1600 2100 2600 3100  Servings Grains 6 7 ½ 10 ½ 12 ½  Vegetables 	 4 4 ½ 5 6 Fruit 4 5 5 6 Dairy (low-fat) 2 ½ 3 3 3 ½  Meat, poultry, fish ½ 1 ½ 2 2 ½  Nuts, seeds ½ ½ ½ 1 Fats and sweets 1 ½ 2 ½ 3 4\par Grains and grain products like breads and cereals provide energy, fiber and vitamins. Whole grains have more of these nutrients. One serving equals one of the following:\par Bagel, 1/2 medium; Barley, cooked 1/2 cup; Biscuit, country style 1 medium; Bread, whole wheat, white 1 slice; Cereals, cold or cooked, 1/2 cup; Cornbread, 1 medium piece; Crackers, alfredo, 2; Crackers, saltine, 4; Dinner roll, 1medium; English muffin, ½; Hamburger bun, ½; Muffin, 1 medium; Pancake, 1 medium; Pasta, 1/2 cup cooked; Liv, 1/2 large or 1 small; Popcorn, 1 cup popped; Pretzels, 1 ounce; Rice, white, brown, or wild, 1/2 cup cooked; Tortillas, corn or flour, 1 medium; Waffle, 1 medium; Wheat germ, 1/4 cup; \par Vegetables are rich sources of potassium, magnesium, and fiber. One serving is 1/2 cup of any of the following cooked vegetables:\par Asparagus, Beans (green, yellow), Beets, Broccoli, Schwertner Sprouts, Carrots, Cauliflower, Fernando, chicory, mustard and turnip (and other) greens, Corn, Kale, Lima beans, Mixed vegetables, Okra, Parsnips, Peas, green, Potatoes (1/2 medium or 1/2 cup mashed), Pumpkin, Rutabaga, Spaghetti or tomato sauce, Spinach, Squash (zucchini or yellow), Stewed tomatoes, Succotash, Sweet potatoes, Turnips, Yam \par Raw vegetables: Carrots,1/2 cup; Celery, 1/2 cup; Lettuce (edwar, loose-leaf, green-leaf), 1 cup; Peppers, 1/2 cup; Spinach, 1 cup; Tomato, 1/2\par Fruits and fruit juices are important sources of potassium and magnesium. Fruits also contain fiber and are low in sodium and fat. One serving equals:\par Any fruit juice, # cup (6 ounces); Canned or frozen fruit, ½ cup (includes applesauce); Dried fruit, ¼ cup; \par Fresh fruit:\par Apple, 1 medium; Apricots, 2 medium; Banana, 1 medium; Berries, 1/2 cup; Melon, 1 wedge, or 1/2 cup; Cherries, 10; Grapefruit, 1/2; Grapes, 15; Kiwi, 1 medium; Dereck, 1/2 small; Nectarine, 1 medium; Orange, 1 medium; Peach, 1 medium; Pear, 1 medium; Pineapple, 1/2 cup; Plums, 2 medium; Tangerine, 1 large\par Dairy foods provide protein and calcium. Use low-fat or nonfat dairy products to cut down on fat. One serving equals:\par Skim milk, 1 cup (8 ounces); 1% low fat milk, 1 cup (8 ounces); 2% low fat milk, 1 cup (8 ounces) nonfat dry milk powder (1/3 cup); Low-fat cottage cheese, 1 cup (8 ounces); Parmesan cheese, 1 tablespoon; Mozzarella cheese, part skim, 1/4 cup (1 ounce); Low-fat cheddar cheese, 11/2 ounces; Ricotta cheese, part skim milk or nonfat, 1/4 cup (11/2 ounces); Other low fat or nonfat cheeses (11/2 oz.); Low-fat or nonfat yogurt, fruit-flavored or plain, 1 cup (8 ounces)\par Low-fat or nonfat frozen yogurt, 1/2 cup (4 ounces); Note: People who can't digest dairy products can try taking lactase enzyme pills or drops (available at drug and grocery stores) when they eat dairy. There is also milk available with the enzyme already added. Or you can buy lactose-free milk.\par Meat, poultry, and fish are good sources of protein and magnesium. One serving equals:\par Lean meat including beef, veal, or pork, 3 ounces cooked; Skinless, white meat poultry including turkey, chicken, 3 ounces; Fish and shellfish, 3 ounces cooked; Low-fat luncheon meats, 1 ounce; Egg, 1 medium; Tofu, 3 ounces\par Note: Three ounces of cooked meat is about the size of a deck of cards.\par Nuts, seeds, and legumes are rich sources of energy, magnesium, potassium, protein and fiber. Nuts and seeds are also high in fat, so portions should be small.\par Almonds, 1/3 cup; Beans such as kidney, grossman, and navy, 1/2 cup cooked; Chickpeas and lentils, 1/2 cup cooked; Cashews, 1/3 cup; Filberts, 1/3 cup; Mixed nuts, 1/3 cup; Peanut butter, 2 tablespoons; Peanuts, 1/3 cup; Sesame seeds, 2 tablespoons; Sunflower seeds, 2 tablespoons; Tofu, regular, 3 ounces; Walnuts, 1/3 cup \par Following the above diet will give you about 27% fat in your diet. The goal is to have 30% or less of the calories you eat each day be from fat. To meet that goal, do not eat more than 2-3 servings daily of added fat. Also try to limit sweets. One serving equals:\par Butter or margarine, 1 teaspoon; Mayonnaise, 1 teaspoon; Low-fat mayonnaise, 1 tablespoon; Salad dressing, 1 tablespoon; Low-fat salad dressing, 2 tablespoons; Oil, 1 teaspoon (use olive, canola, safflower, or other vegetable oils); Candy, hard, 3 pieces; Jelly or jam, 1 tablespoon); Jell-O, 1/2 cup; Jelly beans, 1/2 ounce; Maple syrup, 1 tablespoon; Popsicle, 1; Sherbet or nonfat or low-fat frozen yogurt, 1/2 cup; Sugar, 1 tablespoon; Sugared lemonade or fruit punch, 1 cup (8 ounces); Note: Try diet fruit-flavored gelatin or frozen, canned, or fresh fruit for dessert.\par \par Small amounts of alcohol may have benefits to the heart and blood pressure. However, excess use of alcohol can cause damage to the brain, liver and other organs. It can lead to high blood pressure. Drinking more than two drinks (15 ml)  every day can raise your blood pressure. 15 ml of alcohol equals: \par • one 12-ounce bottle of beer \par • a half glass (5 ounces) of wine \par • 1 ounce (one shot) of 100 proof hard liquor\par

## 2021-11-18 NOTE — ADDENDUM
[FreeTextEntry1] : I spent 60 min face to face time with the patient from 12:00 to 13:00 on 11/17/21. Thirty minutes were devoted to  patient counseling at outlined above in the End of Visit section. Prior to this visit, I reviewed office pulmonary records.
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
## 2021-11-18 NOTE — REVIEW OF SYSTEMS
[Joint Pain] : joint pain [Joint Stiffness] : joint stiffness [Myalgia] : myalgia [Weight Gain (___ Lbs)] : [unfilled] ~Ulb weight gain [FreeTextEntry9] : see HPI

## 2021-11-18 NOTE — PHYSICAL EXAM
[General Appearance - Well Developed] : well developed [Normal Appearance] : normal appearance [Well Groomed] : well groomed [General Appearance - Well Nourished] : well nourished [No Deformities] : no deformities [General Appearance - In No Acute Distress] : no acute distress [Normal Conjunctiva] : the conjunctiva exhibited no abnormalities [Eyelids - No Xanthelasma] : the eyelids demonstrated no xanthelasmas [Normal Oral Mucosa] : normal oral mucosa [No Oral Pallor] : no oral pallor [No Oral Cyanosis] : no oral cyanosis [Normal Oropharynx] : normal oropharynx [Normal Jugular Venous A Waves Present] : normal jugular venous A waves present [Normal Jugular Venous V Waves Present] : normal jugular venous V waves present [No Jugular Venous Vargas A Waves] : no jugular venous vargas A waves [Respiration, Rhythm And Depth] : normal respiratory rhythm and effort [Exaggerated Use Of Accessory Muscles For Inspiration] : no accessory muscle use [Auscultation Breath Sounds / Voice Sounds] : lungs were clear to auscultation bilaterally [Abdomen Soft] : soft [Abdomen Tenderness] : non-tender [Abdomen Mass (___ Cm)] : no abdominal mass palpated [Limping On The Right] : limping on the right [Limping On The Left] : limping on the left [Gait - Steppage Right] : a steppage gait was noted on the right [Gait - Steppage Left] : a steppage gait was noted on the left [Nail Clubbing] : no clubbing of the fingernails [Cyanosis, Localized] : no localized cyanosis [Petechial Hemorrhages (___cm)] : no petechial hemorrhages [Skin Color & Pigmentation] : normal skin color and pigmentation [No Venous Stasis] : no venous stasis [Skin Lesions] : no skin lesions [No Skin Ulcers] : no skin ulcer [No Xanthoma] : no  xanthoma was observed [Oriented To Time, Place, And Person] : oriented to person, place, and time [Affect] : the affect was normal [Mood] : the mood was normal [No Anxiety] : not feeling anxious [5th Left ICS - MCL] : palpated at the 5th LICS in the midclavicular line [Normal] : normal [No Precordial Heave] : no precordial heave was noted [Normal Rate] : normal [Rhythm Regular] : regular [Normal S1] : normal S1 [Normal S2] : normal S2 [No Gallop] : no gallop heard [No Murmur] : no murmurs heard [I] : a grade 1 [] : is unchanged with valsalva [2+] : left 2+ [No Abnormalities] : the abdominal aorta was not enlarged and no bruit was heard [No Pitting Edema] : no pitting edema present [Prolonged Exp Time] : with normal expiratory time [Increased E/I Ratio] : with normal expiratory/inspiratory ratio  [FreeTextEntry1] : ankle circumf 8.25 in right and 8.0 in left (previously 8 in right, 7.75 in left) [Apical Thrill] : no thrill palpable at the apex [S3] : no S3 [S4] : no S4 [Click] : no click [Pericardial Rub] : no pericardial rub [Right Carotid Bruit] : no bruit heard over the right carotid [Left Carotid Bruit] : no bruit heard over the left carotid [Right Femoral Bruit] : no bruit heard over the right femoral artery [Left Femoral Bruit] : no bruit heard over the left femoral artery [Rt] : no varicose veins of the right leg [Lt] : no varicose veins of the left leg

## 2021-11-18 NOTE — CARDIOLOGY SUMMARY
[de-identified] : 11/17/21, normal sinus rhythm at 70 bpm, with  occasional PAC, # PACs = 1. ST-segments and T-waves were normal. Prior ECG 5/12/21 showed normal sinus rhythm at 81 bpm, with frequent PAC s. # PACs = 2. There was nonspecific T-abnormality.  Rhythm / RR-interval analysis 11/17/21, observed 96 beats over 90 sec with mean HR 65 bpm; mean  ms (524-1180); Max/Min 225%; RR SD 78; RR CV 8%. There were 3 PACs and no PVCs [de-identified] : 3/31/21, exercised 4:13 James protocol. Baseline HR 65 bpm increased to peak 134 bpm (89% MPHR). Baseline /71 mm Hg increased to peak 144/73 mm Hg. Test stopped for SOB; no CP. No arrhythmia or ST-segment depression on ECG. No evidence of infarction or inducible ischemia on SPECT; normal LV wall motion; LVEF >=45%. LV time activity curve suggested diastolic dysfunction., RV function appeared normal [de-identified] : 10/28/2020, mitral annular calcification, otherwise normal mitral valve with mild regurgitation. The aorti root was normal. The aortic vlave was calcified trileaflet, with normal opening. The LA was mildly dilated with volume index 37 ccm². The LV was normal in size and wall thickness. There were no segmental wall motion abnormalities and overall systolic function was normal, with LVEF 64%. There was mild diastolic dysfunction (stage I). The RA was normal. The RV was normal in size and function. The tricuspid and pulmonic valves were normal, with minimal tricuspid regurgitation. The pericardium appeared normal, with no effusion. There were no significant changes compared to prior study 8/12/2016

## 2021-11-19 LAB
SODIUM ?TM SUB UR QN: 93 MMOL/L
UUN UR-MCNC: 624 MG/DL

## 2021-11-28 ENCOUNTER — RX RENEWAL (OUTPATIENT)
Age: 71
End: 2021-11-28

## 2021-12-19 ENCOUNTER — LABORATORY RESULT (OUTPATIENT)
Age: 71
End: 2021-12-19

## 2021-12-20 ENCOUNTER — APPOINTMENT (OUTPATIENT)
Dept: CARDIOLOGY | Facility: CLINIC | Age: 71
End: 2021-12-20
Payer: MEDICARE

## 2021-12-20 VITALS
BODY MASS INDEX: 31.54 KG/M2 | WEIGHT: 178 LBS | TEMPERATURE: 96.7 F | HEART RATE: 77 BPM | RESPIRATION RATE: 16 BRPM | SYSTOLIC BLOOD PRESSURE: 140 MMHG | DIASTOLIC BLOOD PRESSURE: 85 MMHG | OXYGEN SATURATION: 97 % | HEIGHT: 63 IN

## 2021-12-20 PROCEDURE — 99213 OFFICE O/P EST LOW 20 MIN: CPT

## 2021-12-20 NOTE — REASON FOR VISIT
[CV Risk Factors and Non-Cardiac Disease] : CV risk factors and non-cardiac disease [Hyperlipidemia] : hyperlipidemia [FreeTextEntry1] : This is a 71 year old male who was referred by his cardiologist Dr. Rooney for lipid consultation\par PMH: HTN w LVH, stage I diastolic dysfunction on TTE, Mild MR, HLD, recurrent sinusitis, arthritis, spinal stenosis, asthma, central sleep apnea, depression, elevated Lp(a), elevated hsCRP\par \par Patient was referred due to his statin intolerance. Previously, he was prescribed atorvastatin 10 mg daily, Crestor, but stopped after two weeks because of nausea. He also tried pravastatin which was discontinued after 2-3 weeks due to abdominal discomfort. He tried pravastatin 20 mg in may 2021 but developed worsening pain and weakness of hips, legs, and ankles which affected his balance and were associated with dysuria, symptoms resolved after discontinuing pravastatin. He states that the leg pain was worse in the right than the left leg but he had equal weakness in both. He also tried Ezetimibe 10 mg but developed body aches and nausea. Patient states he was also tried on rosuvastatin but it also caused muscle aches, tiredness, and weakness.\par \par Patient states he discontinued statins in may 2021. Labs from 11/2021 showed total cholesterol 218, HDL 68, . Patient states he does not want to try a statin again. He states that his blood pressure is always elevated at the beginning of the visit, the provider would check it multiple times throughout the visit and by the end it would be normal. He denies chest pain, palpitations, SOB, CHINCHILLA, lightheadedness, syncope. \par \par Cardiac risk factors: \par hypertension, hyperlipidemia, family history positive for stroke at 56 years old (father) and hypertension (mother), former smoker for 15 years 1 pack/day (quit 40 years ago)\par \par PSH: spinal surgery in 2018

## 2021-12-20 NOTE — DISCUSSION/SUMMARY
[FreeTextEntry1] : This is a 71 year old male with past medical history significant for hypertension, hyperlipidemia, statin intolerance, sleep apnea, asthma, spinal stenosis, mild mitral valve regurgitation, elevated lipoprotein a, and elevated high-sensitivity C-reactive protein, who comes in for lipid consultation.\par He was born in [place] and has no history of rheumatic fever.  He does not drink excessive caffeine or alcohol.\par His cardiac risk factors include hyperlipidemia, and positive family history of atherosclerosis (father had a stroke).\par Blood work done November 16, 2021 demonstrated hemoglobin A1c of 5.7, LDL direct of 129 mg/dL, high-sensitivity C-reactive protein of 4.1, triglycerides 124, cholesterol 218, HDL of 68 mg/dL, non-HDL cholesterol 150 mg/dL.\par The patient has been tried on multiple statins including Crestor 5/10 mg daily, Lipitor 10 mg/day, and was unable to tolerate this medication secondary to muscle aches, extreme weakness which was not improved with dose alteration and rechallenging. He also tried pravastatin which was discontinued after 2-3 weeks due to abdominal discomfort and muscle aches.. He tried pravastatin 20 mg in may 2021 but developed worsening muscle aches, pain and weakness of hips, legs, and ankles which affected his balance, and his symptoms resolved after discontinuing pravastatin. He states that the leg pain was worse in the right than the left leg but he had equal weakness in both. He also tried Ezetimibe 10 mg but developed body aches and nausea.\par Patient states he discontinued statins in may 2021. Labs from 11/2021 showed total cholesterol 218, HDL 68, . Patient states he does not want to try a statin again.\par His 10-year ASCVD risk score is 22%.\par I have explained to the patient that he is at high cardiovascular risk and should be on lipid-lowering therapy.\par Given his statin intolerance and maximally tolerated statin dose of 0, the patient will require PCSK9 injectable therapy.  I recommended that he start Repatha 140 mg subcutaneously every 2 weeks.  He will need blood work 1 week after the second dose.\par If the patient is unable to get insurance coverage for the Repatha therapy, he will require a coronary artery calcium score to document the presence of coronary artery disease.\par He will follow-up with me in 6 weeks to discuss these results.\par \par He is instructed to follow-up with cardiologist, Dr. Rooney and his primary care physician.\par \par I would also recommend that he make an appointment with our registered dietitian, to review his dietary intake, to achieve further lipid lowering effect, and caloric reduction.\par I would also recommend he increase his aerobic activity to 40 minutes 4 times per week.\par \par Thank you for allowing to participate in the care of this patient.  I look forward to working with you again in the future.  Please do not hesitate to call if you have any further questions.

## 2021-12-21 RX ORDER — DOCUSATE SODIUM 100 MG
100 TABLET ORAL DAILY
Qty: 90 | Refills: 3 | Status: COMPLETED | COMMUNITY
Start: 2021-05-13 | End: 2021-12-21

## 2021-12-21 RX ORDER — SENNOSIDES 8.6 MG/1
8.6 CAPSULE, GELATIN COATED ORAL
Qty: 90 | Refills: 3 | Status: COMPLETED | COMMUNITY
Start: 2021-05-13 | End: 2021-12-21

## 2021-12-27 ENCOUNTER — NON-APPOINTMENT (OUTPATIENT)
Age: 71
End: 2021-12-27

## 2021-12-28 ENCOUNTER — NON-APPOINTMENT (OUTPATIENT)
Age: 71
End: 2021-12-28

## 2022-01-03 RX ORDER — EVOLOCUMAB 140 MG/ML
140 INJECTION, SOLUTION SUBCUTANEOUS
Qty: 6 | Refills: 1 | Status: DISCONTINUED | COMMUNITY
Start: 2021-12-21 | End: 2022-01-03

## 2022-01-06 ENCOUNTER — RX RENEWAL (OUTPATIENT)
Age: 72
End: 2022-01-06

## 2022-01-15 LAB
ALBUMIN SERPL ELPH-MCNC: 5.1 G/DL
ALP BLD-CCNC: 101 U/L
ALT SERPL-CCNC: 28 U/L
ANION GAP SERPL CALC-SCNC: 17 MMOL/L
APPEARANCE: CLEAR
AST SERPL-CCNC: 30 U/L
BACTERIA: NEGATIVE
BILIRUB SERPL-MCNC: 0.4 MG/DL
BILIRUBIN URINE: NEGATIVE
BLOOD URINE: NEGATIVE
BUN SERPL-MCNC: 22 MG/DL
CALCIUM SERPL-MCNC: 10 MG/DL
CHLORIDE SERPL-SCNC: 97 MMOL/L
CO2 SERPL-SCNC: 26 MMOL/L
COLOR: YELLOW
CREAT SERPL-MCNC: 1.22 MG/DL
CREAT SPEC-SCNC: 197 MG/DL
CYSTATIN C SERPL-MCNC: 1.3 MG/L
GFR/BSA.PRED SERPLBLD CYS-BASED-ARV: 53 ML/MIN
GLUCOSE QUALITATIVE U: NEGATIVE
GLUCOSE SERPL-MCNC: 81 MG/DL
HYALINE CASTS: 1 /LPF
KETONES URINE: NORMAL
LEUKOCYTE ESTERASE URINE: NEGATIVE
MICROALBUMIN 24H UR DL<=1MG/L-MCNC: 17.7 MG/DL
MICROALBUMIN/CREAT 24H UR-RTO: 90 MG/G
MICROSCOPIC-UA: NORMAL
NITRITE URINE: NEGATIVE
PH URINE: 6
POTASSIUM SERPL-SCNC: 4.4 MMOL/L
PROT SERPL-MCNC: 7.7 G/DL
PROTEIN URINE: ABNORMAL
RED BLOOD CELLS URINE: 3 /HPF
SODIUM ?TM SUB UR QN: 33 MMOL/L
SODIUM SERPL-SCNC: 141 MMOL/L
SPECIFIC GRAVITY URINE: 1.02
SQUAMOUS EPITHELIAL CELLS: 0 /HPF
UROBILINOGEN URINE: NORMAL
UUN UR-MCNC: 817 MG/DL
WHITE BLOOD CELLS URINE: 1 /HPF

## 2022-01-17 ENCOUNTER — LABORATORY RESULT (OUTPATIENT)
Age: 72
End: 2022-01-17

## 2022-01-20 ENCOUNTER — NON-APPOINTMENT (OUTPATIENT)
Age: 72
End: 2022-01-20

## 2022-01-20 DIAGNOSIS — M19.90 UNSPECIFIED OSTEOARTHRITIS, UNSPECIFIED SITE: ICD-10-CM

## 2022-01-25 ENCOUNTER — APPOINTMENT (OUTPATIENT)
Dept: CARDIOLOGY | Facility: CLINIC | Age: 72
End: 2022-01-25
Payer: MEDICARE

## 2022-01-25 VITALS
DIASTOLIC BLOOD PRESSURE: 78 MMHG | TEMPERATURE: 98 F | SYSTOLIC BLOOD PRESSURE: 138 MMHG | WEIGHT: 213 LBS | HEIGHT: 63 IN | BODY MASS INDEX: 37.74 KG/M2 | HEART RATE: 75 BPM | OXYGEN SATURATION: 97 % | RESPIRATION RATE: 16 BRPM

## 2022-01-25 PROCEDURE — 99214 OFFICE O/P EST MOD 30 MIN: CPT

## 2022-01-25 NOTE — REASON FOR VISIT
[CV Risk Factors and Non-Cardiac Disease] : CV risk factors and non-cardiac disease [Hyperlipidemia] : hyperlipidemia [FreeTextEntry1] : This is a 71 year old male who was referred by his cardiologist Dr. Rooney for lipid consultation\par PMH: HTN w LVH, stage I diastolic dysfunction on TTE, Mild MR, HLD, recurrent sinusitis, arthritis, spinal stenosis, asthma, central sleep apnea, depression, elevated Lp(a), elevated hsCRP.  \par \par Patient was referred due to his statin intolerance. Previously, he was prescribed atorvastatin 10 mg daily, Crestor, but stopped after two weeks because of nausea. He also tried pravastatin which was discontinued after 2-3 weeks due to abdominal discomfort. He tried pravastatin 20 mg in may 2021 but developed worsening pain and weakness of hips, legs, and ankles which affected his balance and were associated with dysuria, symptoms resolved after discontinuing pravastatin. He states that the leg pain was worse in the right than the left leg but he had equal weakness in both. He also tried Ezetimibe 10 mg but developed body aches and nausea. Patient states he was also tried on rosuvastatin but it also caused muscle aches, tiredness, and weakness.\par \par Patient states he discontinued statins in may 2021. Labs from Jan 17 showed total cholesterol 240, HDL 79, LDL calc 143. Patient states he does not want to try a statin again. He states that his blood pressure is always elevated at the beginning of the visit, the provider would check it multiple times throughout the visit and by the end it would be normal. He denies chest pain, palpitations, SOB, CHINCHILLA, lightheadedness, syncope. \par \par Cardiac risk factors: hypertension, hyperlipidemia, family history positive for stroke at 56 years old (father) and hypertension (mother), former smoker for 15 years 1 pack/day (quit 40 years ago)\par \par PSH: spinal surgery in 2018

## 2022-02-10 RX ORDER — EZETIMIBE 10 MG/1
10 TABLET ORAL DAILY
Qty: 90 | Refills: 1 | Status: DISCONTINUED | COMMUNITY
Start: 2022-01-25 | End: 2022-02-10

## 2022-02-16 ENCOUNTER — APPOINTMENT (OUTPATIENT)
Dept: CARDIOLOGY | Facility: CLINIC | Age: 72
End: 2022-02-16

## 2022-02-21 RX ORDER — BEMPEDOIC ACID AND EZETIMIBE 180; 10 MG/1; MG/1
180-10 TABLET, FILM COATED ORAL
Qty: 90 | Refills: 1 | Status: DISCONTINUED | COMMUNITY
Start: 2022-01-26 | End: 2022-02-21

## 2022-02-24 ENCOUNTER — TRANSCRIPTION ENCOUNTER (OUTPATIENT)
Age: 72
End: 2022-02-24

## 2022-03-09 ENCOUNTER — NON-APPOINTMENT (OUTPATIENT)
Age: 72
End: 2022-03-09

## 2022-03-11 ENCOUNTER — NON-APPOINTMENT (OUTPATIENT)
Age: 72
End: 2022-03-11

## 2022-04-04 ENCOUNTER — NON-APPOINTMENT (OUTPATIENT)
Age: 72
End: 2022-04-04

## 2022-04-04 DIAGNOSIS — M71.21 SYNOVIAL CYST OF POPLITEAL SPACE [BAKER], RIGHT KNEE: ICD-10-CM

## 2022-04-05 ENCOUNTER — APPOINTMENT (OUTPATIENT)
Dept: CARDIOLOGY | Facility: CLINIC | Age: 72
End: 2022-04-05

## 2022-04-12 ENCOUNTER — NON-APPOINTMENT (OUTPATIENT)
Age: 72
End: 2022-04-12

## 2022-04-27 ENCOUNTER — LABORATORY RESULT (OUTPATIENT)
Age: 72
End: 2022-04-27

## 2022-04-27 ENCOUNTER — NON-APPOINTMENT (OUTPATIENT)
Age: 72
End: 2022-04-27

## 2022-04-27 ENCOUNTER — APPOINTMENT (OUTPATIENT)
Dept: CARDIOLOGY | Facility: CLINIC | Age: 72
End: 2022-04-27
Payer: MEDICARE

## 2022-04-27 VITALS
DIASTOLIC BLOOD PRESSURE: 82 MMHG | HEIGHT: 63 IN | OXYGEN SATURATION: 98 % | TEMPERATURE: 98 F | HEART RATE: 65 BPM | BODY MASS INDEX: 26.93 KG/M2 | WEIGHT: 152 LBS | RESPIRATION RATE: 15 BRPM | SYSTOLIC BLOOD PRESSURE: 140 MMHG

## 2022-04-27 PROCEDURE — 99214 OFFICE O/P EST MOD 30 MIN: CPT | Mod: 25

## 2022-04-27 PROCEDURE — 93000 ELECTROCARDIOGRAM COMPLETE: CPT

## 2022-04-27 RX ORDER — NALXONE HYDROCHLORIDE 0.4 MG/ML
0.4 INJECTION INTRAMUSCULAR; INTRAVENOUS; SUBCUTANEOUS
Qty: 4 | Refills: 0 | Status: COMPLETED | COMMUNITY
Start: 2021-03-26 | End: 2022-04-27

## 2022-04-27 RX ORDER — TIMOLOL MALEATE 5 MG/ML
0.5 SOLUTION OPHTHALMIC DAILY
Qty: 1 | Refills: 0 | Status: COMPLETED | COMMUNITY
Start: 2018-11-28 | End: 2022-04-27

## 2022-04-27 RX ORDER — LAMOTRIGINE 25 MG/1
25 TABLET ORAL
Qty: 360 | Refills: 0 | Status: COMPLETED | COMMUNITY
Start: 2021-01-06 | End: 2022-04-27

## 2022-04-27 RX ORDER — DORZOLAMIDE HYDROCHLORIDE TIMOLOL MALEATE 20; 5 MG/ML; MG/ML
22.3-6.8 SOLUTION/ DROPS OPHTHALMIC
Qty: 10 | Refills: 0 | Status: COMPLETED | COMMUNITY
Start: 2021-06-07 | End: 2022-04-27

## 2022-04-27 RX ORDER — AZELASTINE HYDROCHLORIDE AND FLUTICASONE PROPIONATE 137; 50 UG/1; UG/1
137-50 SPRAY, METERED NASAL
Qty: 23 | Refills: 0 | Status: COMPLETED | COMMUNITY
Start: 2021-05-18 | End: 2022-04-27

## 2022-04-27 NOTE — PHYSICAL EXAM
[Well Developed] : well developed [Well Nourished] : well nourished [No Acute Distress] : no acute distress [Normal Conjunctiva] : normal conjunctiva [Normal Venous Pressure] : normal venous pressure [No Carotid Bruit] : no carotid bruit [Normal S1, S2] : normal S1, S2 [No Rub] : no rub [Clear Lung Fields] : clear lung fields [Good Air Entry] : good air entry [No Respiratory Distress] : no respiratory distress  [Soft] : abdomen soft [Non Tender] : non-tender [No Masses/organomegaly] : no masses/organomegaly [Normal Bowel Sounds] : normal bowel sounds [Normal Gait] : normal gait [No Edema] : no edema [No Cyanosis] : no cyanosis [No Clubbing] : no clubbing [No Varicosities] : no varicosities [No Rash] : no rash [No Skin Lesions] : no skin lesions [Moves all extremities] : moves all extremities [No Focal Deficits] : no focal deficits [Normal Speech] : normal speech [Alert and Oriented] : alert and oriented [Normal memory] : normal memory [5th Left ICS - MCL] : palpated at the 5th LICS in the midclavicular line [Normal] : normal [No Precordial Heave] : no precordial heave was noted [Normal Rate] : normal [Rhythm Regular] : regular [Normal S1] : normal S1 [Normal S2] : normal S2 [No Gallop] : no gallop heard [No Murmur] : no murmurs heard [No Pitting Edema] : no pitting edema present [2+] : left 2+

## 2022-04-27 NOTE — DISCUSSION/SUMMARY
[FreeTextEntry1] : Dr. Earl-(PRIOR VISIT and PMH WITH Dr. Earl): \par This is a 71 year old male with past medical history significant for hypertension, hyperlipidemia, statin intolerance, sleep apnea, asthma, spinal stenosis, mild mitral valve regurgitation, elevated lipoprotein a, and elevated high-sensitivity C-reactive protein, who comes in for lipid follow-up evaluation.\par \par He was born in [place] and has no history of rheumatic fever.  He does not drink excessive caffeine or alcohol. His cardiac risk factors include hyperlipidemia, and positive family history of atherosclerosis (father had a stroke).\par \par The patient is clearly statin intolerant.\par I had recommend the patient start Repatha PCSK9 injectable therapy, but his insurance required an abnormal coronary artery calcium score.  \par \par The patient had a coronary artery calcium score done 12-.  This demonstrated total calcium score of 63 units consisting of 1 unit in the left main, 1 unit in the left anterior descending artery, 7 units in left circumflex artery and 54 units in the right coronary artery.\par \par Unfortunately the insurance company will only cover PCSK9 injectable therapy with a coronary calcium score of 300 or greater.  Given the limitations imposed by his insurance company despite appeal, I will work with him to reduce his LDL cholesterol.  Given the presence of coronary artery disease in the form of coronary artery calcification, his LDL target is less than 70 mg/dL.\par \par He will start Zetia 10 mg daily.  If he is able to tolerate it, I would consider the addition of WelChol and will start paperwork immediately for Nexlezet.  It is possible a foundation may cover the medication for him.\par \par I have also asked him to start reviewing Medicare choices that cover Repatha so when the full comes he can change his Medicare insurance coverage.\par \par Blood work done November 16, 2021 demonstrated hemoglobin A1c of 5.7, LDL direct of 129 mg/dL, high-sensitivity C-reactive protein of 4.1, triglycerides 124, cholesterol 218, HDL of 68 mg/dL, non-HDL cholesterol 150 mg/dL.\par \par The patient has been tried on multiple statins including Crestor 5/10 mg daily, Lipitor 10 mg/day, and was unable to tolerate this medication secondary to muscle aches, extreme weakness which was not improved with dose alteration and rechallenging. He also tried pravastatin which was discontinued after 2-3 weeks due to abdominal discomfort and muscle aches.. He tried pravastatin 20 mg in may 2021 but developed worsening muscle aches, pain and weakness of hips, legs, and ankles which affected his balance, and his symptoms resolved after discontinuing pravastatin. He states that the leg pain was worse in the right than the left leg but he had equal weakness in both. He also tried Ezetimibe 10 mg but developed body aches and nausea.\par Patient states he discontinued statins in may 2021. Labs from 11/2021 showed total cholesterol 218, HDL 68, . Patient states he does not want to try a statin again.\par His 10-year ASCVD risk score is 22%.\par \par He is instructed to follow-up with cardiologist, Dr. Rooney and his primary care physician.\par \par I would also recommend that he make an appointment with our registered dietitian, to review his dietary intake, to achieve further lipid lowering effect, and caloric reduction.\par I would also recommend he increase his aerobic activity to 40 minutes 4 times per week.\par \par Thank you for allowing to participate in the care of this patient.  I look forward to working with you again in the future.  Please do not hesitate to call if you have any further questions.

## 2022-04-27 NOTE — REASON FOR VISIT
[CV Risk Factors and Non-Cardiac Disease] : CV risk factors and non-cardiac disease [Hyperlipidemia] : hyperlipidemia [FreeTextEntry1] : This is a 71 year old male who was referred by his cardiologist Dr. Rooney for lipid consultation. He has a PMH significant for hypertension, hyperlipidemia, statin intolerance, sleep apnea, asthma, spinal stenosis, mild mitral valve regurgitation, elevated lipoprotein a, and elevated high-sensitivity C-reactive protein.\par \par Cardiac risk factors: hypertension, hyperlipidemia, family history positive for stroke at 56 years old (father) and hypertension (mother), former smoker for 15 years 1 pack/day (quit 40 years ago)\par PSH: spinal surgery in 2018\par \par He is here today for follow up lipid consultation and remains on Praluent 150mg/IM and he is tolerating it well. \par \par PMH:\par Patient was referred due to his statin intolerance. Previously, he was prescribed atorvastatin 10 mg daily, Crestor, but stopped after two weeks because of nausea. He also tried pravastatin which was discontinued after 2-3 weeks due to abdominal discomfort. He tried pravastatin 20 mg in may 2021 but developed worsening pain and weakness of hips, legs, and ankles which affected his balance and were associated with dysuria, symptoms resolved after discontinuing pravastatin. He states that the leg pain was worse in the right than the left leg but he had equal weakness in both. He also tried Ezetimibe 10 mg but developed body aches and nausea. Patient states he was also tried on rosuvastatin but it also caused muscle aches, tiredness, and weakness.\par \par Patient states he discontinued statins in may 2021. Labs from Jan 17 showed total cholesterol 240, HDL 79, LDL calc 143. Patient states he does not want to try a statin again. He states that his blood pressure is always elevated at the beginning of the visit, the provider would check it multiple times throughout the visit and by the end it would be normal. He denies chest pain, palpitations, SOB, CHINCHILLA, lightheadedness, syncope. \par \par

## 2022-05-02 ENCOUNTER — RX CHANGE (OUTPATIENT)
Age: 72
End: 2022-05-02

## 2022-05-16 ENCOUNTER — APPOINTMENT (OUTPATIENT)
Dept: PULMONOLOGY | Facility: CLINIC | Age: 72
End: 2022-05-16

## 2022-05-17 ENCOUNTER — APPOINTMENT (OUTPATIENT)
Dept: CARDIOLOGY | Facility: CLINIC | Age: 72
End: 2022-05-17
Payer: MEDICARE

## 2022-05-17 ENCOUNTER — NON-APPOINTMENT (OUTPATIENT)
Age: 72
End: 2022-05-17

## 2022-05-17 VITALS
OXYGEN SATURATION: 96 % | HEIGHT: 63 IN | SYSTOLIC BLOOD PRESSURE: 129 MMHG | DIASTOLIC BLOOD PRESSURE: 79 MMHG | HEART RATE: 68 BPM | BODY MASS INDEX: 31.36 KG/M2 | WEIGHT: 177 LBS | RESPIRATION RATE: 16 BRPM

## 2022-05-17 DIAGNOSIS — N32.81 OVERACTIVE BLADDER: ICD-10-CM

## 2022-05-17 DIAGNOSIS — R94.31 ABNORMAL ELECTROCARDIOGRAM [ECG] [EKG]: ICD-10-CM

## 2022-05-17 PROCEDURE — 99215 OFFICE O/P EST HI 40 MIN: CPT | Mod: 95

## 2022-05-17 RX ORDER — TEMAZEPAM 30 MG/1
30 CAPSULE ORAL
Qty: 90 | Refills: 0 | Status: DISCONTINUED | COMMUNITY
Start: 2021-05-13 | End: 2022-05-17

## 2022-05-17 RX ORDER — BEMPEDOIC ACID 180 MG/1
180 TABLET, FILM COATED ORAL
Qty: 90 | Refills: 1 | Status: DISCONTINUED | COMMUNITY
Start: 2022-02-21 | End: 2022-05-17

## 2022-05-17 RX ORDER — MECLIZINE HYDROCHLORIDE 25 MG/1
25 TABLET ORAL 3 TIMES DAILY
Qty: 90 | Refills: 3 | Status: DISCONTINUED | COMMUNITY
Start: 2020-02-27 | End: 2022-05-17

## 2022-05-17 NOTE — CARDIOLOGY SUMMARY
[de-identified] : 4/27/22, normal sinus rhythm at 65 bpm, with non-specific T-wave abnormality in leads II, III, aVF and V6. Prior ECG 11/17/21 showed normal sinus rhythm at 70 bpm, with  occasional PAC, # PACs = 1. ST-segments and T-waves were normal. [de-identified] : 3/31/21, exercised 4:13 James protocol. Baseline HR 65 bpm increased to peak 134 bpm (89% MPHR). Baseline /71 mm Hg increased to peak 144/73 mm Hg. Test stopped for SOB; no CP. No arrhythmia or ST-segment depression on ECG. No evidence of infarction or inducible ischemia on SPECT; normal LV wall motion; LVEF >=45%. LV time activity curve suggested diastolic dysfunction., RV function appeared normal [de-identified] : 10/28/2020, mitral annular calcification, otherwise normal mitral valve with mild regurgitation. The aorti root was normal. The aortic vlave was calcified trileaflet, with normal opening. The LA was mildly dilated with volume index 37 ccm². The LV was normal in size and wall thickness. There were no segmental wall motion abnormalities and overall systolic function was normal, with LVEF 64%. There was mild diastolic dysfunction (stage I). The RA was normal. The RV was normal in size and function. The tricuspid and pulmonic valves were normal, with minimal tricuspid regurgitation. The pericardium appeared normal, with no effusion. There were no significant changes compared to prior study 8/12/2016

## 2022-05-17 NOTE — DISCUSSION/SUMMARY
[Patient] : the patient [Minutes Spent: ___] : for [unfilled] ~Uminutes [With Me] : with me [___ Month(s)] : in [unfilled] month(s) [FreeTextEntry1] : \par WEIGHT GOALS:\par \par Category				BMI range - kg/m2	BMI Prime\par Very severely underweight		less than 15		less than 0.60	\par Severely underweight			from 15.0 to 16.0		from 0.60 to 0.64	\par Underweight				from 16.0 to 18.5		from 0.64 to 0.74	\par Normal (healthy weight)			from 18.5 to 25		from 0.74 to 1.0	\par Overweight				from 25 to 30		from 1.0 to 1.2	\par Obese Class I (Moderately obese)		from 30 to 35		from 1.2 to 1.4	\par Obese Class II (Severely obese)		from 35 to 40		from 1.4 to 1.6	\par Obese Class III (Very severely obese)	over 40			over 1.6	\par \par Your current weight is 177 lbs (178 lbs on 11/17/21; 170 lbs on 5/12/21; 164 lbs on 10/28/2020; 168 lbs on 5/29/19; 163 lbs on 5/23/18). Given current weight and height 5'3", your calculated body mass index (BMI) is 31.4 kg/sqm. Normal BMI is 18.5-25 kg/sqm. Thus current weight is in the obese class I category. Abdominal waist circumference is measured at the level of the umbilicus and is thus not a pants waist measurement. Your current abdominal waist circumference is 43 in on 11/17/21  (41 in on 10/28/020; 39.5 in on 5/23/18). Normal abdominal waist circumference is < 32 inches for women and < 37 inches for men\par \par DIETARY SALT (SODIUM); DASH DIET AND BLOOD PRESSURE:\par To decrease the sodium in your diet: \par · Use fresh vegetables and foods as much as possible.\par · Avoid canned and processed foods. Cured meats such as rodriguez, ham, and sausages are high in salt.\par · Try using different herbs and spices in your cooking instead of salt.\par · In restaurants, avoid foods with sauces, cheese, and cured meats. Ask for low-sodium choices.\par To get more potassium in your diet, eat:\par · Bananas, fresh or dried apricots, peaches, citrus fruits, melons\par · Cauliflower, broccoli, tomatoes, carrots, raw spinach, beet greens, potatoes\par To get more magnesium in your diet, eat:\par · Whole grain foods, leafy green vegetables, dried fruits\par • Fish and seafood, poultry \par To get more calcium in your diet, eat:\par · Nonfat milk, yogurt, and low-fat cheeses \par · [place] and sardines\par · Cooked dried beans\par · Broccoli, kale, and bok filiberto\par · Tofu or soybean curd\par DASH stands for "dietary approaches to stop hypertension." The DASH diet is low in total and saturated fat. It is rich in fruits, vegetables, and low-fat dairy foods. The diet allows you to get natural fiber, calcium, and magnesium from food. It prevents or lowers high blood pressure. It can also help lower cholesterol in your blood. \par Don't change how you eat all at once. It's much more likely that you'll succeed if you make only one or two small changes at a time. Wait until those changes are a habit, then make a couple more changes. Some good starting steps include: \par Add one serving of vegetables to your meals at lunch and dinner. This is an easy way to help you get more vegetables in your diet. \par Have a piece of fruit as an afternoon or after-dinner snack. One glass of juice at breakfast is not enough fruit in your diet. \par Use half your usual amount of butter, margarine, or salad dressing. \par Buy nonfat salad dressing or nonfat sour cream.\par Follow this guide to select your menu of meals. The number of calories we want you to eat each day will tell you how many servings you can choose from each food group.\par Calories: 1600 2100 2600 3100  Servings Grains 6 7 ½ 10 ½ 12 ½  Vegetables 	 4 4 ½ 5 6 Fruit 4 5 5 6 Dairy (low-fat) 2 ½ 3 3 3 ½  Meat, poultry, fish ½ 1 ½ 2 2 ½  Nuts, seeds ½ ½ ½ 1 Fats and sweets 1 ½ 2 ½ 3 4\par Grains and grain products like breads and cereals provide energy, fiber and vitamins. Whole grains have more of these nutrients. One serving equals one of the following:\par Bagel, 1/2 medium; Barley, cooked 1/2 cup; Biscuit, country style 1 medium; Bread, whole wheat, white 1 slice; Cereals, cold or cooked, 1/2 cup; Cornbread, 1 medium piece; Crackers, alfredo, 2; Crackers, saltine, 4; Dinner roll, 1medium; English muffin, ½; Hamburger bun, ½; Muffin, 1 medium; Pancake, 1 medium; Pasta, 1/2 cup cooked; Liv, 1/2 large or 1 small; Popcorn, 1 cup popped; Pretzels, 1 ounce; Rice, white, brown, or wild, 1/2 cup cooked; Tortillas, corn or flour, 1 medium; Waffle, 1 medium; Wheat germ, 1/4 cup; \par Vegetables are rich sources of potassium, magnesium, and fiber. One serving is 1/2 cup of any of the following cooked vegetables:\par Asparagus, Beans (green, yellow), Beets, Broccoli, [place] Sprouts, Carrots, Cauliflower, Fernando, chicory, mustard and turnip (and other) greens, Corn, Kale, Lima beans, Mixed vegetables, Okra, Parsnips, Peas, green, Potatoes (1/2 medium or 1/2 cup mashed), Pumpkin, Rutabaga, Spaghetti or tomato sauce, Spinach, Squash (zucchini or yellow), Stewed tomatoes, Succotash, Sweet potatoes, Turnips, Yam \par Raw vegetables: Carrots,1/2 cup; Celery, 1/2 cup; Lettuce (edwar, loose-leaf, green-leaf), 1 cup; Peppers, 1/2 cup; Spinach, 1 cup; Tomato, 1/2\par Fruits and fruit juices are important sources of potassium and magnesium. Fruits also contain fiber and are low in sodium and fat. One serving equals:\par Any fruit juice, # cup (6 ounces); Canned or frozen fruit, ½ cup (includes applesauce); Dried fruit, ¼ cup; \par Fresh fruit:\par Apple, 1 medium; Apricots, 2 medium; Banana, 1 medium; Berries, 1/2 cup; Melon, 1 wedge, or 1/2 cup; Cherries, 10; Grapefruit, 1/2; Grapes, 15; Kiwi, 1 medium; Dereck, 1/2 small; Nectarine, 1 medium; Orange, 1 medium; Peach, 1 medium; Pear, 1 medium; Pineapple, 1/2 cup; Plums, 2 medium; Tangerine, 1 large\par Dairy foods provide protein and calcium. Use low-fat or nonfat dairy products to cut down on fat. One serving equals:\par Skim milk, 1 cup (8 ounces); 1% low fat milk, 1 cup (8 ounces); 2% low fat milk, 1 cup (8 ounces) nonfat dry milk powder (1/3 cup); Low-fat cottage cheese, 1 cup (8 ounces); Parmesan cheese, 1 tablespoon; Mozzarella cheese, part skim, 1/4 cup (1 ounce); Low-fat cheddar cheese, 11/2 ounces; Ricotta cheese, part skim milk or nonfat, 1/4 cup (11/2 ounces); Other low fat or nonfat cheeses (11/2 oz.); Low-fat or nonfat yogurt, fruit-flavored or plain, 1 cup (8 ounces)\par Low-fat or nonfat frozen yogurt, 1/2 cup (4 ounces); Note: People who can't digest dairy products can try taking lactase enzyme pills or drops (available at drug and grocery stores) when they eat dairy. There is also milk available with the enzyme already added. Or you can buy lactose-free milk.\par Meat, poultry, and fish are good sources of protein and magnesium. One serving equals:\par Lean meat including beef, veal, or pork, 3 ounces cooked; Skinless, white meat poultry including turkey, chicken, 3 ounces; Fish and shellfish, 3 ounces cooked; Low-fat luncheon meats, 1 ounce; Egg, 1 medium; Tofu, 3 ounces\par Note: Three ounces of cooked meat is about the size of a deck of cards.\par Nuts, seeds, and legumes are rich sources of energy, magnesium, potassium, protein and fiber. Nuts and seeds are also high in fat, so portions should be small.\par Almonds, 1/3 cup; Beans such as kidney, grossman, and navy, 1/2 cup cooked; Chickpeas and lentils, 1/2 cup cooked; Cashews, 1/3 cup; Filberts, 1/3 cup; Mixed nuts, 1/3 cup; Peanut butter, 2 tablespoons; Peanuts, 1/3 cup; Sesame seeds, 2 tablespoons; Sunflower seeds, 2 tablespoons; Tofu, regular, 3 ounces; Walnuts, 1/3 cup \par Following the above diet will give you about 27% fat in your diet. The goal is to have 30% or less of the calories you eat each day be from fat. To meet that goal, do not eat more than 2-3 servings daily of added fat. Also try to limit sweets. One serving equals:\par Butter or margarine, 1 teaspoon; Mayonnaise, 1 teaspoon; Low-fat mayonnaise, 1 tablespoon; Salad dressing, 1 tablespoon; Low-fat salad dressing, 2 tablespoons; Oil, 1 teaspoon (use olive, canola, safflower, or other vegetable oils); Candy, hard, 3 pieces; Jelly or jam, 1 tablespoon); Jell-O, 1/2 cup; Jelly beans, 1/2 ounce; Maple syrup, 1 tablespoon; Popsicle, 1; Sherbet or nonfat or low-fat frozen yogurt, 1/2 cup; Sugar, 1 tablespoon; Sugared lemonade or fruit punch, 1 cup (8 ounces); Note: Try diet fruit-flavored gelatin or frozen, canned, or fresh fruit for dessert.\par \par Small amounts of alcohol may have benefits to the heart and blood pressure. However, excess use of alcohol can cause damage to the brain, liver and other organs. It can lead to high blood pressure. Drinking more than two drinks (15 ml)  every day can raise your blood pressure. 15 ml of alcohol equals: \par • one 12-ounce bottle of beer \par • a half glass (5 ounces) of wine \par • 1 ounce (one shot) of 100 proof hard liquor\par
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
## 2022-05-17 NOTE — REVIEW OF SYSTEMS
[Weight Gain (___ Lbs)] : [unfilled] ~Ulb weight gain [Joint Pain] : joint pain [Joint Stiffness] : joint stiffness [Myalgia] : myalgia [FreeTextEntry9] : see HPI

## 2022-05-17 NOTE — ADDENDUM
[FreeTextEntry1] : I spent 60 min face to face time with the patient by telemedicine (marcus Woodall)  from 12:00 to 13:00 on 5/17/22. Informed consent for telemedicine was obtained prior to the visit. Thirty minutes were devoted to  patient counseling at outlined above in the End of Visit section. Prior to this visit, I reviewed office pulmonary records.

## 2022-05-17 NOTE — PHYSICAL EXAM
[General Appearance - Well Developed] : well developed [Normal Appearance] : normal appearance [Well Groomed] : well groomed [General Appearance - Well Nourished] : well nourished [No Deformities] : no deformities [General Appearance - In No Acute Distress] : no acute distress [Normal Conjunctiva] : the conjunctiva exhibited no abnormalities [Eyelids - No Xanthelasma] : the eyelids demonstrated no xanthelasmas [Normal Oral Mucosa] : normal oral mucosa [No Oral Pallor] : no oral pallor [No Oral Cyanosis] : no oral cyanosis [Normal Oropharynx] : normal oropharynx [Normal Jugular Venous A Waves Present] : normal jugular venous A waves present [Normal Jugular Venous V Waves Present] : normal jugular venous V waves present [No Jugular Venous Vargas A Waves] : no jugular venous vargas A waves [Respiration, Rhythm And Depth] : normal respiratory rhythm and effort [Exaggerated Use Of Accessory Muscles For Inspiration] : no accessory muscle use [Auscultation Breath Sounds / Voice Sounds] : lungs were clear to auscultation bilaterally [Abdomen Soft] : soft [Abdomen Tenderness] : non-tender [Abdomen Mass (___ Cm)] : no abdominal mass palpated [Limping On The Right] : limping on the right [Limping On The Left] : limping on the left [Gait - Steppage Right] : a steppage gait was noted on the right [Gait - Steppage Left] : a steppage gait was noted on the left [Nail Clubbing] : no clubbing of the fingernails [Cyanosis, Localized] : no localized cyanosis [Petechial Hemorrhages (___cm)] : no petechial hemorrhages [Skin Color & Pigmentation] : normal skin color and pigmentation [No Venous Stasis] : no venous stasis [Skin Lesions] : no skin lesions [No Skin Ulcers] : no skin ulcer [No Xanthoma] : no  xanthoma was observed [Oriented To Time, Place, And Person] : oriented to person, place, and time [Affect] : the affect was normal [Mood] : the mood was normal [No Anxiety] : not feeling anxious [5th Left ICS - MCL] : palpated at the 5th LICS in the midclavicular line [Normal] : normal [No Precordial Heave] : no precordial heave was noted [Normal Rate] : normal [Rhythm Regular] : regular [Normal S1] : normal S1 [Normal S2] : normal S2 [No Gallop] : no gallop heard [No Murmur] : no murmurs heard [I] : a grade 1 [] : is unchanged with valsalva [2+] : left 2+ [No Abnormalities] : the abdominal aorta was not enlarged and no bruit was heard [No Pitting Edema] : no pitting edema present [Prolonged Exp Time] : with normal expiratory time [Increased E/I Ratio] : with normal expiratory/inspiratory ratio  [FreeTextEntry1] : significant right knee swelling. There was no periperal or ankle edema. ankle circumf 8.25 in right and 8.0 in left (previously 8 in right, 7.75 in left) [Apical Thrill] : no thrill palpable at the apex [S3] : no S3 [S4] : no S4 [Click] : no click [Pericardial Rub] : no pericardial rub [Right Carotid Bruit] : no bruit heard over the right carotid [Left Carotid Bruit] : no bruit heard over the left carotid [Right Femoral Bruit] : no bruit heard over the right femoral artery [Rt] : no varicose veins of the right leg [Left Femoral Bruit] : no bruit heard over the left femoral artery [Lt] : no varicose veins of the left leg

## 2022-05-25 ENCOUNTER — APPOINTMENT (OUTPATIENT)
Dept: CARDIOLOGY | Facility: CLINIC | Age: 72
End: 2022-05-25

## 2022-09-03 ENCOUNTER — LABORATORY RESULT (OUTPATIENT)
Age: 72
End: 2022-09-03

## 2022-09-14 ENCOUNTER — NON-APPOINTMENT (OUTPATIENT)
Age: 72
End: 2022-09-14

## 2022-09-14 ENCOUNTER — APPOINTMENT (OUTPATIENT)
Dept: CARDIOLOGY | Facility: CLINIC | Age: 72
End: 2022-09-14

## 2022-09-14 VITALS
HEART RATE: 66 BPM | SYSTOLIC BLOOD PRESSURE: 128 MMHG | TEMPERATURE: 98 F | DIASTOLIC BLOOD PRESSURE: 70 MMHG | BODY MASS INDEX: 31.54 KG/M2 | WEIGHT: 178 LBS | OXYGEN SATURATION: 97 % | HEIGHT: 63 IN | RESPIRATION RATE: 14 BRPM

## 2022-09-14 PROCEDURE — 99214 OFFICE O/P EST MOD 30 MIN: CPT | Mod: 25

## 2022-09-14 PROCEDURE — 93000 ELECTROCARDIOGRAM COMPLETE: CPT

## 2022-09-14 NOTE — PHYSICAL EXAM
[Well Developed] : well developed [Well Nourished] : well nourished [No Acute Distress] : no acute distress [Normal Conjunctiva] : normal conjunctiva [Normal Venous Pressure] : normal venous pressure [No Carotid Bruit] : no carotid bruit [Normal S1, S2] : normal S1, S2 [No Rub] : no rub [5th Left ICS - MCL] : palpated at the 5th LICS in the midclavicular line [Normal] : normal [No Precordial Heave] : no precordial heave was noted [Normal Rate] : normal [Rhythm Regular] : regular [Normal S1] : normal S1 [Normal S2] : normal S2 [No Gallop] : no gallop heard [No Murmur] : no murmurs heard [No Pitting Edema] : no pitting edema present [2+] : left 2+ [Clear Lung Fields] : clear lung fields [Good Air Entry] : good air entry [No Respiratory Distress] : no respiratory distress  [Soft] : abdomen soft [Non Tender] : non-tender [No Masses/organomegaly] : no masses/organomegaly [Normal Bowel Sounds] : normal bowel sounds [Normal Gait] : normal gait [No Edema] : no edema [No Cyanosis] : no cyanosis [No Clubbing] : no clubbing [No Varicosities] : no varicosities [No Rash] : no rash [No Skin Lesions] : no skin lesions [Moves all extremities] : moves all extremities [No Focal Deficits] : no focal deficits [Normal Speech] : normal speech [Alert and Oriented] : alert and oriented [Normal memory] : normal memory

## 2022-09-14 NOTE — REASON FOR VISIT
[CV Risk Factors and Non-Cardiac Disease] : CV risk factors and non-cardiac disease [Hyperlipidemia] : hyperlipidemia [FreeTextEntry1] : This is a 72 year year old male here today for follow up cardiac evaluation. \par He has a past medical history significant for hypertension, hyperlipidemia, statin intolerance, sleep apnea, asthma, spinal stenosis, mild mitral valve regurgitation, elevated lipoprotein a, and elevated high-sensitivity C-reactive protein, who comes in for lipid follow-up evaluation.\par \par CHIEF COMPLAINT:\par Today he is feeling generally well and does not have any complaints at this time.  He is currently prescribed Praluent 150 mg/mL for cholesterol management and is on amlodipine 10 mg daily, losartan 50 mg 1 tablet twice a day for blood pressure management as directed by his own cardiologist.\par \par He denies fever, chills, weight loss, malaise, rash, alteration bowel habits, weakness, abdominal  pain, bloating, changes in urination, visual disturbances, chest pain, headaches, dizziness, heart palpitations, recent episodes of syncope or falls at this time.\par \par  \par \par Cardiac risk factors: hypertension, hyperlipidemia, family history positive for stroke at 56 years old (father) and hypertension (mother), former smoker for 15 years 1 pack/day (quit 40 years ago)\par PSH: spinal surgery in 2018\par \par He is here today for follow up lipid consultation and remains on Praluent 150mg/IM and he is tolerating it well. \par \par PMH:\par Patient was referred due to his statin intolerance. Previously, he was prescribed atorvastatin 10 mg daily, Crestor, but stopped after two weeks because of nausea. He also tried pravastatin which was discontinued after 2-3 weeks due to abdominal discomfort. He tried pravastatin 20 mg in may 2021 but developed worsening pain and weakness of hips, legs, and ankles which affected his balance and were associated with dysuria, symptoms resolved after discontinuing pravastatin. He states that the leg pain was worse in the right than the left leg but he had equal weakness in both. He also tried Ezetimibe 10 mg but developed body aches and nausea. Patient states he was also tried on rosuvastatin but it also caused muscle aches, tiredness, and weakness.\par \par Patient states he discontinued statins in may 2021. Labs from Jan 17 showed total cholesterol 240, HDL 79, LDL calc 143. Patient states he does not want to try a statin again. He states that his blood pressure is always elevated at the beginning of the visit, the provider would check it multiple times throughout the visit and by the end it would be normal. He denies chest pain, palpitations, SOB, CHINCHILLA, lightheadedness, syncope. \par \par

## 2022-09-14 NOTE — ASSESSMENT
[FreeTextEntry1] : This is a 72 year year old male here today for follow up cardiac evaluation. \par He has a past medical history significant for hypertension, hyperlipidemia, statin intolerance, sleep apnea, asthma, spinal stenosis, mild mitral valve regurgitation, elevated lipoprotein a, and elevated high-sensitivity C-reactive protein, who comes in for lipid follow-up evaluation.\par \par CHIEF COMPLAINT:\par Today he is feeling generally well and does not have any complaints at this time.  He is currently prescribed Praluent 150 mg/mL for cholesterol management and is on amlodipine 10 mg daily, losartan 50 mg 1 tablet twice a day for blood pressure management as directed by his own cardiologist.\par \par Cardiac risk factors: hypertension, hyperlipidemia, family history positive for stroke at 56 years old (father) and hypertension (mother), former smoker for 15 years 1 pack/day (quit 40 years ago)\par PSH: spinal surgery in 2018.\par \par He was born in [place] and has no history of rheumatic fever.  He does not drink excessive caffeine or alcohol. His cardiac risk factors include hyperlipidemia, and positive family history of atherosclerosis (father had a stroke).\par \par BLOOD PRESSURE:\par -BP is controlled in today's visit. \par -He will follow up with his Cardiologist. \par \par -I have discussed the importance of maintaining good BP control and reviewed the newest guidelines with the patient while re-enforcing dietary sodium restrictions to no more than 2-3 g daily, DASH diet, life style modifications as well as the goal of maintaining ideal body weight with the patient today. I have advised the patient to avoid the use of over-the-counter medications/ supplements especially NSAIDS.\par \par I have reviewed with Mr. LEVINE that serious health consequences can occur when blood pressure is not well controlled and the need for strict compliance with medication and that optimal control can significantly reduce the risk of cardiovascular disease stroke, heart attack and other organ damage. They verbally expressed understanding of the fore mentioned serious health consequences to me today.\par \par BLOOD WORK:\par -New blood work was done September 3, 2022 which demonstrated normal lipid profile and direct LDL of 78.\par -New blood work was done 4/21/22 which demonstrated normal lipid profile. LDL 72.\par -New blood work was done today, 04/27/2022  to evaluate lipid profile, CBC, BMP, hepatic function, A1C and TSH. \par -Blood work script given to the patient to have blood work done prior to her next follow up appointment. \par \par PMH:\par Patient was referred due to his statin intolerance. Previously, he was prescribed atorvastatin 10 mg daily, Crestor, but stopped after two weeks because of nausea. He also tried pravastatin which was discontinued after 2-3 weeks due to abdominal discomfort. He tried pravastatin 20 mg in may 2021 but developed worsening pain and weakness of hips, legs, and ankles which affected his balance and were associated with dysuria, symptoms resolved after discontinuing pravastatin. He states that the leg pain was worse in the right than the left leg but he had equal weakness in both. He also tried Ezetimibe 10 mg but developed body aches and nausea. Patient states he was also tried on rosuvastatin but it also caused muscle aches, tiredness, and weakness.\par \par Patient states he discontinued statins in may 2021. Labs from Jan 17 showed total cholesterol 240, HDL 79, LDL calc 143. Patient states he does not want to try a statin again. He states that his blood pressure is always elevated at the beginning of the visit, the provider would check it multiple times throughout the visit and by the end it would be normal. He denies chest pain, palpitations, SOB, CHINCHILLA, lightheadedness, syncope. \par \par CHOLESTEROL CONTROL:\par -Patient will continue the advised  TLC diet and to continue follow-up for treatment of hyperlipidemia and repeat blood testing with diet and exercise. I have discussed different exercises and the importance of maintenance of optimal body weight. The importance of staying within guidelines and recommendations was stressed to the patient today and they acknowledged that they understand this to me verbally.\par \par  -Mr. LEVINE was educated and advised that failure to follow-up with my medical recommendations to lower cholesterol can result in severe health consequences therefore, they will continuing a low saturated and low fat diet and to avoid excessive carbohydrates to help reduce triglycerides and that lowering LDL levels is associated with a significant decrease in serious cardiac events including but not limited to heart attack stroke and overall death. We will continue lipid lowering agents as advised based on blood test results and the patient understands to call if they develop severe muscle discomfort or if they have a reddish tinted discoloration in their urine.\par \par TESTING/REPORTS:\par -EKG done 09/14/2022 which demonstrated regular sinus rhythm with nonspecific ST-T wave changes BPM of 69 with 1 PVC.\par \par -EKG done Apr 27, 2022 which demonstrated regular sinus rhythm with nonspecific ST-T wave changes, BPM of 65.\par \par -The patient had a coronary artery calcium score done 12-.  This demonstrated total calcium score of 63 units consisting of 1 unit in the left main, 1 unit in the left anterior descending artery, 7 units in left circumflex artery and 54 units in the right coronary artery.\par \par PLAN:\par -He will continue with his usual medications and will contact the office if he is having any complaints between now and their next follow up appointment.\par -He states that he may be interested in Inclisiran in the future.\par -He will follow up with his Cardiologist.\par \par I have discussed the plan of care with Mr. REGGIE LEVINE  and he  will follow up in 3 months. He is compliant with all of his medications.\par \par The patient understands that aerobic exercises must be increased to minutes 4 times/week and a detailed discussion of lifestyle modification was done today. \par The patient has a good understanding of the diagnosis, treatment plan and lifestyle modification. \par He will contact me at the office for any questions with their care or any changes in their health status.\par \par \par Jani VASQUEZ

## 2022-09-19 RX ORDER — PRAMIPEXOLE DIHYDROCHLORIDE 3 MG/1
3 TABLET, EXTENDED RELEASE ORAL DAILY
Qty: 90 | Refills: 3 | Status: COMPLETED | COMMUNITY
Start: 2018-11-28 | End: 2022-09-19

## 2022-11-06 ENCOUNTER — RX RENEWAL (OUTPATIENT)
Age: 72
End: 2022-11-06

## 2022-11-08 ENCOUNTER — APPOINTMENT (OUTPATIENT)
Dept: CARDIOLOGY | Facility: CLINIC | Age: 72
End: 2022-11-08

## 2022-11-08 VITALS
WEIGHT: 176 LBS | HEART RATE: 71 BPM | SYSTOLIC BLOOD PRESSURE: 154 MMHG | RESPIRATION RATE: 16 BRPM | DIASTOLIC BLOOD PRESSURE: 87 MMHG | BODY MASS INDEX: 31.18 KG/M2 | HEIGHT: 63 IN

## 2022-11-08 VITALS — HEART RATE: 81 BPM | DIASTOLIC BLOOD PRESSURE: 88 MMHG | SYSTOLIC BLOOD PRESSURE: 133 MMHG

## 2022-11-08 DIAGNOSIS — F41.9 ANXIETY DISORDER, UNSPECIFIED: ICD-10-CM

## 2022-11-08 DIAGNOSIS — E66.3 OVERWEIGHT: ICD-10-CM

## 2022-11-08 DIAGNOSIS — H04.123 DRY EYE SYNDROME OF BILATERAL LACRIMAL GLANDS: ICD-10-CM

## 2022-11-08 PROCEDURE — 99215 OFFICE O/P EST HI 40 MIN: CPT | Mod: 95

## 2022-11-08 RX ORDER — FLUTICASONE FUROATE AND VILANTEROL TRIFENATATE 200; 25 UG/1; UG/1
200-25 POWDER RESPIRATORY (INHALATION)
Qty: 60 | Refills: 0 | Status: DISCONTINUED | COMMUNITY
Start: 2022-05-13 | End: 2022-11-08

## 2022-11-08 NOTE — PHYSICAL EXAM
[General Appearance - Well Developed] : well developed [Normal Appearance] : normal appearance [Well Groomed] : well groomed [General Appearance - Well Nourished] : well nourished [No Deformities] : no deformities [General Appearance - In No Acute Distress] : no acute distress [Normal Conjunctiva] : the conjunctiva exhibited no abnormalities [Eyelids - No Xanthelasma] : the eyelids demonstrated no xanthelasmas [Normal Oral Mucosa] : normal oral mucosa [No Oral Pallor] : no oral pallor [No Oral Cyanosis] : no oral cyanosis [Normal Oropharynx] : normal oropharynx [Normal Jugular Venous A Waves Present] : normal jugular venous A waves present [Normal Jugular Venous V Waves Present] : normal jugular venous V waves present [No Jugular Venous Vargas A Waves] : no jugular venous vargas A waves [Respiration, Rhythm And Depth] : normal respiratory rhythm and effort [Exaggerated Use Of Accessory Muscles For Inspiration] : no accessory muscle use [Auscultation Breath Sounds / Voice Sounds] : lungs were clear to auscultation bilaterally [Abdomen Soft] : soft [Abdomen Tenderness] : non-tender [Abdomen Mass (___ Cm)] : no abdominal mass palpated [Limping On The Right] : limping on the right [Limping On The Left] : limping on the left [Gait - Steppage Right] : a steppage gait was noted on the right [Gait - Steppage Left] : a steppage gait was noted on the left [Nail Clubbing] : no clubbing of the fingernails [Cyanosis, Localized] : no localized cyanosis [Petechial Hemorrhages (___cm)] : no petechial hemorrhages [Skin Color & Pigmentation] : normal skin color and pigmentation [No Venous Stasis] : no venous stasis [Skin Lesions] : no skin lesions [No Skin Ulcers] : no skin ulcer [No Xanthoma] : no  xanthoma was observed [Oriented To Time, Place, And Person] : oriented to person, place, and time [Affect] : the affect was normal [Mood] : the mood was normal [No Anxiety] : not feeling anxious [5th Left ICS - MCL] : palpated at the 5th LICS in the midclavicular line [Normal] : normal [No Precordial Heave] : no precordial heave was noted [Normal Rate] : normal [Rhythm Regular] : regular [Normal S1] : normal S1 [Normal S2] : normal S2 [No Gallop] : no gallop heard [No Murmur] : no murmurs heard [I] : a grade 1 [] : is unchanged with valsalva [2+] : left 2+ [No Abnormalities] : the abdominal aorta was not enlarged and no bruit was heard [No Pitting Edema] : no pitting edema present [Prolonged Exp Time] : with normal expiratory time [Increased E/I Ratio] : with normal expiratory/inspiratory ratio  [FreeTextEntry1] : R ankle circumf 8.0" ; L ankle 8.0" (previously 8.25" and 8.0" respectively) [Apical Thrill] : no thrill palpable at the apex [S3] : no S3 [S4] : no S4 [Click] : no click [Pericardial Rub] : no pericardial rub [Right Carotid Bruit] : no bruit heard over the right carotid [Left Carotid Bruit] : no bruit heard over the left carotid [Right Femoral Bruit] : no bruit heard over the right femoral artery [Left Femoral Bruit] : no bruit heard over the left femoral artery [Rt] : no varicose veins of the right leg [Lt] : no varicose veins of the left leg

## 2022-11-08 NOTE — HISTORY OF PRESENT ILLNESS
[FreeTextEntry1] : Mr. Gomez has h/o HTN with LVH and stage I diastolic dysfunction on TTE, mild MR by TTE, HLD, recurrent sinusitis, diffuse arthritis, spinal stenosis (with need for opioid for pain relief) anxiety, asthma, central sleep apnea, depression, elevated hsCRP, elevated Lp(a), elevated Hgb A1C, elevated transaminase, gastritis, glaucoma,  hypokalemia, insomnia, iron deficiency anemia due to chronic blood loss, statin intolerance with myalgia and myositis, restless leg syndrome, intermittent vertigo, vitamin D deficiency and Baker's cyst of right knee.  He was initially seen 1999 for elevated and labile BP in the setting of painful osteoarthritis, degenerative spinal stenosis, seasonal allergies with chronic recurrent sinusitis and peptic ulcer disease / gastritis, presumably due to chronic use of NSAIDs. He was on FERNANDO-2 inhibitors as well. by 9/20/10, he was no longer taking clonidine, for BP and was on stable dose of milnacipran (Savella) for fibromyalgia. There was insomnia and history of heavy snoring. He used temazepam (Restoril) intermittently.  In June 2014, he was diagnosed with central sleep apnea. He started CPAP. He noted more energy for awhile, but soon afterwards, there was recurrence of fatigue. He used Provigil. By 10/23/15, he was not using CPAP due to sense of claustrophobia. He used samples of indomethacin and allopurinol for painful joints, which were thought to be gout (though not assessed). He underwent pulmonary re-assessment on 10/7/16 and was fitted for new CPAP for ROSALIE. On 2/6/18, he underwent minimally invasive posterior lumbar fusion surgery of L5 - S1.  By 5/25/18, he was off most pain medication. By 5/29/19, he started THC for pain management under direction of Dr. Epi Ribera, physical medicine and rehab at [facility]. He was prescribed atorvastatin 10 mg daily, but stopped after two weeks because of nausea. On 10/28/2020 he was no longer on THC, but was using Celebrex, lidocaine 5% external patch, oxycodone and OxyContin for pain management. He gained weight during COVID-19 pandemic, having decreased to as low as 134 lbs in March 2020. Pravastatin was stopped after 2-3 weeks due to abdominal discomfort. He used Moringa seeds for pain, but stopped because pain worsened. On 3/18/21, he noted shortness of breath with moderate physical activity; no ischemia on nuclear stress. TTE and stress test noted LV diastolic dysfunction. He completed Pfizer COVID vaccinations February 2021.In May 2021, he developed worsening pain and weakness of hips, legs and ankles on pravastatin 20 mg daily; resolved with discontinuation. He substituted ezetimibe (Zetia) 10 mg daily, but developed body aches and nausea. He completed COVID booster October 2021. By 11/17/21, he was no no lipid lowering therapy, maintaining celecoxib (Celebrex) for relief of arthritic pain. MRI of swollen R knee March 2022 showed Baker's cyst. He received hyaluronic injections to knee and was instructed to limit exercise.  After brief trial of Nexletol 180 mg daily, he started Praluent 150 mg every two weeks, well tolerated. He was not using pramipexole, prescribed for restless leg. He was not using temazepam for sleep. He started colchicine for presumed pseudogout of R shoulder; no diarrhea. By 11/8/22, his main  complaints were neck pain chronic fatigue, diaphoresis, and SOB. He planned traveling to [place] for January 2023. He noted dietary and salt indiscretion, with weight gain. He consumed 2 glasses wine or one glass whiskey daily. He was not using CPAP for ROSALIE.

## 2022-11-08 NOTE — DISCUSSION/SUMMARY
[Patient] : the patient [Minutes Spent: ___] : for [unfilled] ~Uminutes [With Me] : with me [___ Month(s)] : in [unfilled] month(s) [FreeTextEntry1] : \par WEIGHT GOALS:\par \par Category				BMI range - kg/m2	BMI Prime\par Very severely underweight		less than 15		less than 0.60	\par Severely underweight			from 15.0 to 16.0		from 0.60 to 0.64	\par Underweight				from 16.0 to 18.5		from 0.64 to 0.74	\par Normal (healthy weight)			from 18.5 to 25		from 0.74 to 1.0	\par Overweight				from 25 to 30		from 1.0 to 1.2	\par Obese Class I (Moderately obese)		from 30 to 35		from 1.2 to 1.4	\par Obese Class II (Severely obese)		from 35 to 40		from 1.4 to 1.6	\par Obese Class III (Very severely obese)	over 40			over 1.6	\par \par Your current weight is 176 lbs (177 lbs on 5/17/22; 178 lbs on 11/17/21; 170 lbs on 5/12/21). Given current weight and height 5'3", your calculated body mass index (BMI) is 31.2 kg/sqm. Normal BMI is 18.5-25 kg/sqm. Thus current weight is in the obese class I category. Abdominal waist circumference is measured at the level of the umbilicus and is thus not a pants waist measurement. Your current abdominal waist circumference is 43" (41" on 10/28/020; 39.5" on 5/23/18). Normal abdominal waist circumference is < 32 inches for women and < 37 inches for men\par \par DIETARY SALT (SODIUM); DASH DIET AND BLOOD PRESSURE:\par To decrease the sodium in your diet: \par · Use fresh vegetables and foods as much as possible.\par · Avoid canned and processed foods. Cured meats such as rodriguez, ham, and sausages are high in salt.\par · Try using different herbs and spices in your cooking instead of salt.\par · In restaurants, avoid foods with sauces, cheese, and cured meats. Ask for low-sodium choices.\par To get more potassium in your diet, eat:\par · Bananas, fresh or dried apricots, peaches, citrus fruits, melons\par · Cauliflower, broccoli, tomatoes, carrots, raw spinach, beet greens, potatoes\par To get more magnesium in your diet, eat:\par · Whole grain foods, leafy green vegetables, dried fruits\par • Fish and seafood, poultry \par To get more calcium in your diet, eat:\par · Nonfat milk, yogurt, and low-fat cheeses \par · [place] and sardines\par · Cooked dried beans\par · Broccoli, kale, and bok filiberto\par · Tofu or soybean curd\par DASH stands for "dietary approaches to stop hypertension." The DASH diet is low in total and saturated fat. It is rich in fruits, vegetables, and low-fat dairy foods. The diet allows you to get natural fiber, calcium, and magnesium from food. It prevents or lowers high blood pressure. It can also help lower cholesterol in your blood. \par Don't change how you eat all at once. It's much more likely that you'll succeed if you make only one or two small changes at a time. Wait until those changes are a habit, then make a couple more changes. Some good starting steps include: \par Add one serving of vegetables to your meals at lunch and dinner. This is an easy way to help you get more vegetables in your diet. \par Have a piece of fruit as an afternoon or after-dinner snack. One glass of juice at breakfast is not enough fruit in your diet. \par Use half your usual amount of butter, margarine, or salad dressing. \par Buy nonfat salad dressing or nonfat sour cream.\par Follow this guide to select your menu of meals. The number of calories we want you to eat each day will tell you how many servings you can choose from each food group.\par Calories: 1600 2100 2600 3100  Servings Grains 6 7 ½ 10 ½ 12 ½  Vegetables 	 4 4 ½ 5 6 Fruit 4 5 5 6 Dairy (low-fat) 2 ½ 3 3 3 ½  Meat, poultry, fish ½ 1 ½ 2 2 ½  Nuts, seeds ½ ½ ½ 1 Fats and sweets 1 ½ 2 ½ 3 4\par Grains and grain products like breads and cereals provide energy, fiber and vitamins. Whole grains have more of these nutrients. One serving equals one of the following:\par Bagel, 1/2 medium; Barley, cooked 1/2 cup; Biscuit, country style 1 medium; Bread, whole wheat, white 1 slice; Cereals, cold or cooked, 1/2 cup; Cornbread, 1 medium piece; Crackers, alfredo, 2; Crackers, saltine, 4; Dinner roll, 1medium; English muffin, ½; Hamburger bun, ½; Muffin, 1 medium; Pancake, 1 medium; Pasta, 1/2 cup cooked; Liv, 1/2 large or 1 small; Popcorn, 1 cup popped; Pretzels, 1 ounce; Rice, white, brown, or wild, 1/2 cup cooked; Tortillas, corn or flour, 1 medium; Waffle, 1 medium; Wheat germ, 1/4 cup; \par Vegetables are rich sources of potassium, magnesium, and fiber. One serving is 1/2 cup of any of the following cooked vegetables:\par Asparagus, Beans (green, yellow), Beets, Broccoli, [place] Sprouts, Carrots, Cauliflower, Fernando, chicory, mustard and turnip (and other) greens, Corn, Kale, Lima beans, Mixed vegetables, Okra, Parsnips, Peas, green, Potatoes (1/2 medium or 1/2 cup mashed), Pumpkin, Rutabaga, Spaghetti or tomato sauce, Spinach, Squash (zucchini or yellow), Stewed tomatoes, Succotash, Sweet potatoes, Turnips, Yam \par Raw vegetables: Carrots,1/2 cup; Celery, 1/2 cup; Lettuce (edwar, loose-leaf, green-leaf), 1 cup; Peppers, 1/2 cup; Spinach, 1 cup; Tomato, 1/2\par Fruits and fruit juices are important sources of potassium and magnesium. Fruits also contain fiber and are low in sodium and fat. One serving equals:\par Any fruit juice, # cup (6 ounces); Canned or frozen fruit, ½ cup (includes applesauce); Dried fruit, ¼ cup; \par Fresh fruit:\par Apple, 1 medium; Apricots, 2 medium; Banana, 1 medium; Berries, 1/2 cup; Melon, 1 wedge, or 1/2 cup; Cherries, 10; Grapefruit, 1/2; Grapes, 15; Kiwi, 1 medium; Dereck, 1/2 small; Nectarine, 1 medium; Orange, 1 medium; Peach, 1 medium; Pear, 1 medium; Pineapple, 1/2 cup; Plums, 2 medium; Tangerine, 1 large\par Dairy foods provide protein and calcium. Use low-fat or nonfat dairy products to cut down on fat. One serving equals:\par Skim milk, 1 cup (8 ounces); 1% low fat milk, 1 cup (8 ounces); 2% low fat milk, 1 cup (8 ounces) nonfat dry milk powder (1/3 cup); Low-fat cottage cheese, 1 cup (8 ounces); Parmesan cheese, 1 tablespoon; Mozzarella cheese, part skim, 1/4 cup (1 ounce); Low-fat cheddar cheese, 11/2 ounces; Ricotta cheese, part skim milk or nonfat, 1/4 cup (11/2 ounces); Other low fat or nonfat cheeses (11/2 oz.); Low-fat or nonfat yogurt, fruit-flavored or plain, 1 cup (8 ounces)\par Low-fat or nonfat frozen yogurt, 1/2 cup (4 ounces); Note: People who can't digest dairy products can try taking lactase enzyme pills or drops (available at drug and grocery stores) when they eat dairy. There is also milk available with the enzyme already added. Or you can buy lactose-free milk.\par Meat, poultry, and fish are good sources of protein and magnesium. One serving equals:\par Lean meat including beef, veal, or pork, 3 ounces cooked; Skinless, white meat poultry including turkey, chicken, 3 ounces; Fish and shellfish, 3 ounces cooked; Low-fat luncheon meats, 1 ounce; Egg, 1 medium; Tofu, 3 ounces\par Note: Three ounces of cooked meat is about the size of a deck of cards.\par Nuts, seeds, and legumes are rich sources of energy, magnesium, potassium, protein and fiber. Nuts and seeds are also high in fat, so portions should be small.\par Almonds, 1/3 cup; Beans such as kidney, grossman, and navy, 1/2 cup cooked; Chickpeas and lentils, 1/2 cup cooked; Cashews, 1/3 cup; Filberts, 1/3 cup; Mixed nuts, 1/3 cup; Peanut butter, 2 tablespoons; Peanuts, 1/3 cup; Sesame seeds, 2 tablespoons; Sunflower seeds, 2 tablespoons; Tofu, regular, 3 ounces; Walnuts, 1/3 cup \par Following the above diet will give you about 27% fat in your diet. The goal is to have 30% or less of the calories you eat each day be from fat. To meet that goal, do not eat more than 2-3 servings daily of added fat. Also try to limit sweets. One serving equals:\par Butter or margarine, 1 teaspoon; Mayonnaise, 1 teaspoon; Low-fat mayonnaise, 1 tablespoon; Salad dressing, 1 tablespoon; Low-fat salad dressing, 2 tablespoons; Oil, 1 teaspoon (use olive, canola, safflower, or other vegetable oils); Candy, hard, 3 pieces; Jelly or jam, 1 tablespoon); Jell-O, 1/2 cup; Jelly beans, 1/2 ounce; Maple syrup, 1 tablespoon; Popsicle, 1; Sherbet or nonfat or low-fat frozen yogurt, 1/2 cup; Sugar, 1 tablespoon; Sugared lemonade or fruit punch, 1 cup (8 ounces); Note: Try diet fruit-flavored gelatin or frozen, canned, or fresh fruit for dessert.\par \par Small amounts of alcohol may have benefits to the heart and blood pressure. However, excess use of alcohol can cause damage to the brain, liver and other organs. It can lead to high blood pressure. Drinking more than two drinks (15 ml)  every day can raise your blood pressure. 15 ml of alcohol equals: \par • one 12-ounce bottle of beer \par • a half glass (5 ounces) of wine \par • 1 ounce (one shot) of 100 proof hard liquor\par

## 2022-11-08 NOTE — CARDIOLOGY SUMMARY
[de-identified] : 4/27/22, normal sinus rhythm at 65 bpm, with non-specific T-wave abnormality in leads II, III, aVF and V6. Prior ECG 11/17/21 showed normal sinus rhythm at 70 bpm, with  occasional PAC, # PACs = 1. ST-segments and T-waves were normal. [de-identified] : \par \par Time		SBP	DBP	MAP	HR	Position 11/8/2022\par 11:00		154	87	109	71	Omron L Arm Seated\par 11:01		151	88	109	83	Omron L Arm Seated\par 11:02		145	81	102	81	Omron L Arm Seated\par 11:03		153	86	108	81	Omron L Arm Seated\par 11:04		133	88	103	81	Omron L Arm Seated\par 11:05		150	79	103	79	Omron L Arm Seated\par 11:06		140	75	97	76	Omron L Arm Seated\par 11:07		148	74	99	74	Omron L Arm Seated\par 11:08		142	75	97	73	Omron L Arm Seated\par 11:45		132	86	101	79	Omron L Arm Seated\par 11:46		145	76	99	75	Omron L Arm Seated\par 11:47		143	76	98	76	Omron L Arm Seated\par 11:48		142	78	99	75	Omron L Arm Seated\par 11:49		144	76	99	71	Omron L Arm Seated\par 11:50		159	94	116	80	Omron L Arm Standing\par 11:51		167	94	118	80	Omron L Arm Standing\par Mean		147	82	104	77	Omron L All 11/8/22\par SD		9.0	6.9	6.6	3.8	Omron L All 11/8/22\par Med		145	80	102	77.5	Omron L All 11/8/22\par Max		167	94	118	83	Omron L All 11/8/22\par Min		132	74	97	71	Omron L All 11/8/22\par  [de-identified] : 3/31/21, exercised 4:13 James protocol. Baseline HR 65 bpm increased to peak 134 bpm (89% MPHR). Baseline /71 mm Hg increased to peak 144/73 mm Hg. Test stopped for SOB; no CP. No arrhythmia or ST-segment depression on ECG. No evidence of infarction or inducible ischemia on SPECT; normal LV wall motion; LVEF >=45%. LV time activity curve suggested diastolic dysfunction., RV function appeared normal [de-identified] : 10/28/2020, mitral annular calcification, otherwise normal mitral valve with mild regurgitation. The aorti root was normal. The aortic vlave was calcified trileaflet, with normal opening. The LA was mildly dilated with volume index 37 ccm². The LV was normal in size and wall thickness. There were no segmental wall motion abnormalities and overall systolic function was normal, with LVEF 64%. There was mild diastolic dysfunction (stage I). The RA was normal. The RV was normal in size and function. The tricuspid and pulmonic valves were normal, with minimal tricuspid regurgitation. The pericardium appeared normal, with no effusion. There were no significant changes compared to prior study 8/12/2016

## 2022-11-08 NOTE — ADDENDUM
[FreeTextEntry1] : I spent 60 min face to face time with the patient by telemedicine (marcus Woodall)  from 12:00 to 13:00 on 11/8/22. Informed consent for telemedicine was obtained prior to the visit. Thirty minutes were devoted to  patient counseling at outlined above in the End of Visit section. Prior to this visit, I reviewed office pulmonary records.

## 2022-11-28 DIAGNOSIS — N17.9 ACUTE KIDNEY FAILURE, UNSPECIFIED: ICD-10-CM

## 2022-11-28 DIAGNOSIS — Z79.899 OTHER LONG TERM (CURRENT) DRUG THERAPY: ICD-10-CM

## 2023-01-13 ENCOUNTER — APPOINTMENT (OUTPATIENT)
Dept: CARDIOLOGY | Facility: CLINIC | Age: 73
End: 2023-01-13

## 2023-03-09 ENCOUNTER — LABORATORY RESULT (OUTPATIENT)
Age: 73
End: 2023-03-09

## 2023-03-14 ENCOUNTER — APPOINTMENT (OUTPATIENT)
Dept: CARDIOLOGY | Facility: CLINIC | Age: 73
End: 2023-03-14
Payer: MEDICARE

## 2023-03-14 VITALS
RESPIRATION RATE: 16 BRPM | WEIGHT: 175 LBS | DIASTOLIC BLOOD PRESSURE: 84 MMHG | HEIGHT: 63 IN | HEART RATE: 54 BPM | BODY MASS INDEX: 31.01 KG/M2 | OXYGEN SATURATION: 96 % | TEMPERATURE: 96 F | SYSTOLIC BLOOD PRESSURE: 144 MMHG

## 2023-03-14 DIAGNOSIS — R79.82 ELEVATED C-REACTIVE PROTEIN (CRP): ICD-10-CM

## 2023-03-14 PROCEDURE — 99214 OFFICE O/P EST MOD 30 MIN: CPT

## 2023-03-14 NOTE — DISCUSSION/SUMMARY
[FreeTextEntry1] : This is a 71 year old male with past medical history significant for coronary artery calcifications, hypertension, hyperlipidemia, statin intolerance, sleep apnea, asthma, spinal stenosis, mild mitral valve regurgitation, elevated lipoprotein a, GERD, and elevated high-sensitivity C-reactive protein, who comes in for lipid follow-up evaluation.\par He was born in [place] and has no history of rheumatic fever.  He does not drink excessive caffeine or alcohol. His cardiac risk factors include hyperlipidemia, and positive family history of atherosclerosis (father had a stroke).\par The patient is tolerating Praluent 150 mg subcutaneously every 2 weeks.\par Lipid profile done March 9, 2023 demonstrated a cholesterol of 166, HDL 67, triglycerides 66, LDL direct 82 mg/dL and non-HDL cholesterol 100 mg/dL with hemoglobin A1c of 5.8.\par The patient is concerned about his increase in his hemoglobin A1c to a prediabetic state.\par I recommend he work with a registered dietitian to improve his hyperglycemia, salt intake, hyperlipidemia and achieve weight loss.\par I have also asked him to increase his aerobic activity to 40 minutes 4 times per week.e.  \par Coronary artery calcium score done 12- demonstrated  63 units consisting of 1 unit in the left main, 1 unit in the left anterior descending artery, 7 units in left circumflex artery and 54 units in the right coronary artery\par Blood work done November 16, 2021 demonstrated hemoglobin A1c of 5.7, LDL direct of 129 mg/dL, high-sensitivity C-reactive protein of 4.1, triglycerides 124, cholesterol 218, HDL of 68 mg/dL, non-HDL cholesterol 150 mg/dL.\par \par The patient has been tried on multiple statins including Crestor 5/10 mg daily, Lipitor 10 mg/day, and was unable to tolerate this medication secondary to muscle aches, extreme weakness which was not improved with dose alteration and rechallenging. He also tried pravastatin which was discontinued after 2-3 weeks due to abdominal discomfort and muscle aches.. He tried pravastatin 20 mg in may 2021 but developed worsening muscle aches, pain and weakness of hips, legs, and ankles which affected his balance, and his symptoms resolved after discontinuing pravastatin. He states that the leg pain was worse in the right than the left leg but he had equal weakness in both. He also tried Ezetimibe 10 mg but developed body aches and nausea.\par Patient states he discontinued statins in may 2021. Labs from 11/2021 showed total cholesterol 218, HDL 68, . Patient states he does not want to try a statin again.\par His 10-year ASCVD risk score is 22%.\par \par He is instructed to follow-up with cardiologist, Dr. Rooney and his primary care physician.\par \par \par Thank you for allowing to participate in the care of this patient.  I look forward to working with you again in the future.  Please do not hesitate to call if you have any further questions.

## 2023-03-14 NOTE — ASSESSMENT
[FreeTextEntry1] : Prior note nurse practitioner Karoline September 14, 2022::\par \par This is a 72 year year old male here today for follow up cardiac evaluation. \par He has a past medical history significant for hypertension, hyperlipidemia, statin intolerance, sleep apnea, asthma, spinal stenosis, mild mitral valve regurgitation, elevated lipoprotein a, and elevated high-sensitivity C-reactive protein, who comes in for lipid follow-up evaluation.\par \par CHIEF COMPLAINT:\par Today he is feeling generally well and does not have any complaints at this time.  He is currently prescribed Praluent 150 mg/mL for cholesterol management and is on amlodipine 10 mg daily, losartan 50 mg 1 tablet twice a day for blood pressure management as directed by his own cardiologist.\par \par Cardiac risk factors: hypertension, hyperlipidemia, family history positive for stroke at 56 years old (father) and hypertension (mother), former smoker for 15 years 1 pack/day (quit 40 years ago)\par PSH: spinal surgery in 2018.\par \par He was born in [place] and has no history of rheumatic fever.  He does not drink excessive caffeine or alcohol. His cardiac risk factors include hyperlipidemia, and positive family history of atherosclerosis (father had a stroke).\par \par BLOOD PRESSURE:\par -BP is controlled in today's visit. \par -He will follow up with his Cardiologist. \par \par -I have discussed the importance of maintaining good BP control and reviewed the newest guidelines with the patient while re-enforcing dietary sodium restrictions to no more than 2-3 g daily, DASH diet, life style modifications as well as the goal of maintaining ideal body weight with the patient today. I have advised the patient to avoid the use of over-the-counter medications/ supplements especially NSAIDS.\par \par I have reviewed with Mr. LEVINE that serious health consequences can occur when blood pressure is not well controlled and the need for strict compliance with medication and that optimal control can significantly reduce the risk of cardiovascular disease stroke, heart attack and other organ damage. They verbally expressed understanding of the fore mentioned serious health consequences to me today.\par \par BLOOD WORK:\par -New blood work was done September 3, 2022 which demonstrated normal lipid profile and direct LDL of 78.\par -New blood work was done 4/21/22 which demonstrated normal lipid profile. LDL 72.\par -New blood work was done today, 04/27/2022  to evaluate lipid profile, CBC, BMP, hepatic function, A1C and TSH. \par -Blood work script given to the patient to have blood work done prior to her next follow up appointment. \par \par PMH:\par Patient was referred due to his statin intolerance. Previously, he was prescribed atorvastatin 10 mg daily, Crestor, but stopped after two weeks because of nausea. He also tried pravastatin which was discontinued after 2-3 weeks due to abdominal discomfort. He tried pravastatin 20 mg in may 2021 but developed worsening pain and weakness of hips, legs, and ankles which affected his balance and were associated with dysuria, symptoms resolved after discontinuing pravastatin. He states that the leg pain was worse in the right than the left leg but he had equal weakness in both. He also tried Ezetimibe 10 mg but developed body aches and nausea. Patient states he was also tried on rosuvastatin but it also caused muscle aches, tiredness, and weakness.\par \par Patient states he discontinued statins in may 2021. Labs from Jan 17 showed total cholesterol 240, HDL 79, LDL calc 143. Patient states he does not want to try a statin again. He states that his blood pressure is always elevated at the beginning of the visit, the provider would check it multiple times throughout the visit and by the end it would be normal. He denies chest pain, palpitations, SOB, CHINCHILLA, lightheadedness, syncope. \par \par CHOLESTEROL CONTROL:\par -Patient will continue the advised  TLC diet and to continue follow-up for treatment of hyperlipidemia and repeat blood testing with diet and exercise. I have discussed different exercises and the importance of maintenance of optimal body weight. The importance of staying within guidelines and recommendations was stressed to the patient today and they acknowledged that they understand this to me verbally.\par \par  -Mr. LEVINE was educated and advised that failure to follow-up with my medical recommendations to lower cholesterol can result in severe health consequences therefore, they will continuing a low saturated and low fat diet and to avoid excessive carbohydrates to help reduce triglycerides and that lowering LDL levels is associated with a significant decrease in serious cardiac events including but not limited to heart attack stroke and overall death. We will continue lipid lowering agents as advised based on blood test results and the patient understands to call if they develop severe muscle discomfort or if they have a reddish tinted discoloration in their urine.\par \par TESTING/REPORTS:\par -EKG done 09/14/2022 which demonstrated regular sinus rhythm with nonspecific ST-T wave changes BPM of 69 with 1 PVC.\par \par -EKG done Apr 27, 2022 which demonstrated regular sinus rhythm with nonspecific ST-T wave changes, BPM of 65.\par \par -The patient had a coronary artery calcium score done 12-.  This demonstrated total calcium score of 63 units consisting of 1 unit in the left main, 1 unit in the left anterior descending artery, 7 units in left circumflex artery and 54 units in the right coronary artery.\par \par PLAN:\par -He will continue with his usual medications and will contact the office if he is having any complaints between now and their next follow up appointment.\par -He states that he may be interested in Inclisiran in the future.\par -He will follow up with his Cardiologist.\par \par I have discussed the plan of care with Mr. REGGIE LEVINE  and he  will follow up in 3 months. He is compliant with all of his medications.\par \par The patient understands that aerobic exercises must be increased to minutes 4 times/week and a detailed discussion of lifestyle modification was done today. \par The patient has a good understanding of the diagnosis, treatment plan and lifestyle modification. \par He will contact me at the office for any questions with their care or any changes in their health status.\par \par \par Jani VASQUEZ

## 2023-03-14 NOTE — REASON FOR VISIT
[CV Risk Factors and Non-Cardiac Disease] : CV risk factors and non-cardiac disease [Hyperlipidemia] : hyperlipidemia [FreeTextEntry1] : This is a 72 year old male here today for follow up lipid evaluation. \par He has a past medical history significant for hypertension, hyperlipidemia, statin intolerance, sleep apnea, asthma, spinal stenosis, mild mitral valve regurgitation, elevated lipoprotein a, and elevated high-sensitivity C-reactive protein.\par \par CHIEF COMPLAINT:\par Today he is feeling generally well and does not have any complaints at this time.  He is currently prescribed Praluent 150 mg/mL for cholesterol management and is on amlodipine 10 mg daily, telmisartan 80 mg for blood pressure management as directed by his own cardiologist.\par \par He denies fever, chills, weight loss, malaise, rash, alteration bowel habits, weakness, abdominal  pain, bloating, changes in urination, visual disturbances, chest pain, headaches, dizziness, heart palpitations, recent episodes of syncope or falls at this time. Today his BP was 144/80, but he states that it is always high initially when he sees his cardiologist but after repeated measurements over 15 minutes it averages to around 130/80.\par \par Cardiac risk factors: hypertension, hyperlipidemia, family history positive for stroke at 56 years old (father) and hypertension (mother) and hyperlipidemia (mother), former smoker for 15 years 1 pack/day (quit 40 years ago)\par PSH: spinal surgery in 2018\par \par On 3/9/23, triglycerides were 66, cholesterol 166, HDL 67, LDL 82, non-HDL cholesterol 100, A1c 5.8

## 2023-03-17 ENCOUNTER — TRANSCRIPTION ENCOUNTER (OUTPATIENT)
Age: 73
End: 2023-03-17

## 2023-03-20 ENCOUNTER — RX RENEWAL (OUTPATIENT)
Age: 73
End: 2023-03-20

## 2023-03-30 RX ORDER — CYCLOBENZAPRINE HYDROCHLORIDE 5 MG/1
5 TABLET, FILM COATED ORAL
Refills: 0 | Status: DISCONTINUED | COMMUNITY
Start: 2022-03-25 | End: 2023-03-30

## 2023-03-30 RX ORDER — FLUTICASONE FUROATE, UMECLIDINIUM BROMIDE AND VILANTEROL TRIFENATATE 200; 62.5; 25 UG/1; UG/1; UG/1
200-62.5-25 POWDER RESPIRATORY (INHALATION) DAILY
Qty: 3 | Refills: 3 | Status: DISCONTINUED | COMMUNITY
Start: 2022-11-08 | End: 2023-03-30

## 2023-03-30 RX ORDER — AMMONIUM LACTATE 12 %
12 CREAM (GRAM) TOPICAL
Qty: 385 | Refills: 0 | Status: COMPLETED | COMMUNITY
Start: 2022-03-07 | End: 2023-03-30

## 2023-03-30 RX ORDER — MILK THISTLE 150 MG
500 CAPSULE ORAL DAILY
Qty: 90 | Refills: 3 | Status: DISCONTINUED | COMMUNITY
Start: 2020-10-29 | End: 2023-03-30

## 2023-03-30 RX ORDER — MIRABEGRON 50 MG/1
50 TABLET, FILM COATED, EXTENDED RELEASE ORAL DAILY
Qty: 90 | Refills: 3 | Status: DISCONTINUED | COMMUNITY
Start: 2022-05-10 | End: 2023-03-30

## 2023-03-30 RX ORDER — COLCHICINE 0.6 MG/1
0.6 TABLET ORAL
Refills: 0 | Status: DISCONTINUED | COMMUNITY
Start: 2022-05-09 | End: 2023-03-30

## 2023-03-31 ENCOUNTER — NON-APPOINTMENT (OUTPATIENT)
Age: 73
End: 2023-03-31

## 2023-03-31 ENCOUNTER — APPOINTMENT (OUTPATIENT)
Dept: CARDIOLOGY | Facility: CLINIC | Age: 73
End: 2023-03-31
Payer: MEDICARE

## 2023-03-31 VITALS
RESPIRATION RATE: 16 BRPM | HEART RATE: 63 BPM | OXYGEN SATURATION: 98 % | BODY MASS INDEX: 29.95 KG/M2 | DIASTOLIC BLOOD PRESSURE: 73 MMHG | HEIGHT: 63 IN | WEIGHT: 169 LBS | SYSTOLIC BLOOD PRESSURE: 134 MMHG

## 2023-03-31 VITALS — SYSTOLIC BLOOD PRESSURE: 148 MMHG | DIASTOLIC BLOOD PRESSURE: 82 MMHG | HEART RATE: 67 BPM | OXYGEN SATURATION: 99 %

## 2023-03-31 VITALS — DIASTOLIC BLOOD PRESSURE: 85 MMHG | HEART RATE: 67 BPM | OXYGEN SATURATION: 99 % | SYSTOLIC BLOOD PRESSURE: 158 MMHG

## 2023-03-31 VITALS — OXYGEN SATURATION: 97 % | DIASTOLIC BLOOD PRESSURE: 70 MMHG | HEART RATE: 64 BPM | SYSTOLIC BLOOD PRESSURE: 135 MMHG

## 2023-03-31 VITALS — DIASTOLIC BLOOD PRESSURE: 70 MMHG | OXYGEN SATURATION: 97 % | HEART RATE: 63 BPM | SYSTOLIC BLOOD PRESSURE: 138 MMHG

## 2023-03-31 DIAGNOSIS — E87.6 HYPOKALEMIA: ICD-10-CM

## 2023-03-31 DIAGNOSIS — Z01.810 ENCOUNTER FOR PREPROCEDURAL CARDIOVASCULAR EXAMINATION: ICD-10-CM

## 2023-03-31 DIAGNOSIS — I51.7 CARDIOMEGALY: ICD-10-CM

## 2023-03-31 DIAGNOSIS — R73.09 OTHER ABNORMAL GLUCOSE: ICD-10-CM

## 2023-03-31 DIAGNOSIS — Z71.3 DIETARY COUNSELING AND SURVEILLANCE: ICD-10-CM

## 2023-03-31 DIAGNOSIS — R00.0 TACHYCARDIA, UNSPECIFIED: ICD-10-CM

## 2023-03-31 DIAGNOSIS — E66.9 OBESITY, UNSPECIFIED: ICD-10-CM

## 2023-03-31 PROCEDURE — 99215 OFFICE O/P EST HI 40 MIN: CPT

## 2023-03-31 PROCEDURE — 93040 RHYTHM ECG WITH REPORT: CPT | Mod: 59

## 2023-03-31 PROCEDURE — 93000 ELECTROCARDIOGRAM COMPLETE: CPT

## 2023-03-31 RX ORDER — ALBUTEROL SULFATE 90 UG/1
108 (90 BASE) INHALANT RESPIRATORY (INHALATION)
Qty: 1 | Refills: 5 | Status: ACTIVE | COMMUNITY
Start: 2020-11-04

## 2023-03-31 RX ORDER — HYDROCODONE BITARTRATE AND ACETAMINOPHEN 7.5; 3 MG/1; MG/1
7.5-3 TABLET ORAL
Qty: 240 | Refills: 0 | Status: ACTIVE | COMMUNITY
Start: 2023-03-31

## 2023-03-31 RX ORDER — FAMOTIDINE 40 MG/1
40 TABLET, FILM COATED ORAL DAILY
Qty: 90 | Refills: 3 | Status: ACTIVE | COMMUNITY
Start: 2023-03-30

## 2023-03-31 NOTE — HISTORY OF PRESENT ILLNESS
[FreeTextEntry1] : Mr. Gomez has h/o HTN with LVH and stage I diastolic dysfunction on TTE, mild MR by TTE, HLD, recurrent sinusitis, diffuse arthritis, spinal stenosis (with need for opioid for pain relief) anxiety, asthma, central sleep apnea, depression, elevated hsCRP, elevated Lp(a), elevated Hgb A1C, elevated transaminase, gastritis, glaucoma,  hypokalemia, insomnia, iron deficiency anemia due to chronic blood loss, statin intolerance with myalgia and myositis, restless leg syndrome, intermittent vertigo, vitamin D deficiency and Baker's cyst of right knee. He was initially seen 1999 for elevated and labile BP in the setting of painful osteoarthritis, degenerative spinal stenosis, seasonal allergies with chronic recurrent sinusitis and peptic ulcer disease / gastritis, presumably due to chronic use of NSAIDs. He was on FERNANDO-2 inhibitors as well. By 9/20/10, he was no longer taking clonidine and was on stable dose of milnacipran (Savella) for fibromyalgia. There was insomnia and history of heavy snoring. He used temazepam (Restoril) intermittently.  In June 2014, he was diagnosed with central sleep apnea. He started CPAP. He noted more energy for awhile, but soon afterwards, there was recurrence of fatigue. He used Provigil. By 10/23/15, he was not using CPAP due to sense of claustrophobia. He used samples of indomethacin and allopurinol for painful joints, which were thought to be gout (though not assessed). He underwent minimally invasive posterior lumbar fusion surgery of L5 - S1, 2/6/18.  By 5/25/18, he was off most pain medication. By 5/29/19, he started THC for pain management under direction of Dr. Epi Ribera, physical medicine and rehab at [facility]. He was prescribed atorvastatin 10 mg daily, but stopped after two weeks because of nausea. On 10/28/2020 he was no longer on THC, using Celebrex, lidocaine 5% external patch, oxycodone and OxyContin for pain management. He gained weight during COVID-19 pandemic, having decreased to as low as 134 lbs in March 2020. Pravastatin was stopped after 2-3 weeks due to abdominal discomfort. He used Moringa seeds for pain, but stopped because pain worsened. On 3/18/21, he noted SOB with moderate physical activity; no ischemia on nuclear stress. TTE and stress test noted LV diastolic dysfunction. He completed Pfizer COVID vaccinations Feb 2021.In May 2021, he developed worsening pain and weakness of hips, legs and ankles on pravastatin 20 mg daily; resolved with discontinuation. He substituted ezetimibe (Zetia) 10 mg daily, but developed body aches and nausea. He completed COVID booster Oct 2021. By 11/17/21, he was on no lipid lowering therapy, maintaining celecoxib (Celebrex) for relief of arthritic pain. MRI of swollen R knee March 2022 showed Baker's cyst. He received hyaluronic injections to knee and was instructed to limit exercise.  After brief trial of Nexletol 180 mg daily, he started Praluent 150 mg every two weeks, well tolerated. He was not using pramipexole, prescribed for restless leg. He was not using temazepam for sleep. He started colchicine for presumed pseudogout of R shoulder; no diarrhea. By 11/8/22, his main  complaints were neck pain chronic fatigue, diaphoresis, and SOB. He noted dietary and salt indiscretion, with weight gain. He consumed 2 glasses wine or one glass whiskey daily. He was not using CPAP for ROSALIE. ENT assessment for SOB and hoarseness noted edema of trachea, attributed to Myrbetriq (d/c and substituted with Vesicare). The differential also included GERD with reflux. There was also blood in stool.  Endoscopy and colonoscopy were planned for 4/3/23.

## 2023-03-31 NOTE — PHYSICAL EXAM
[Increased E/I Ratio] : with normal expiratory/inspiratory ratio  [Prolonged Exp Time] : with normal expiratory time [FreeTextEntry1] : R ankle circumf 8.0" ; L ankle 8.0" (previously 8.25" and 8.0" respectively) [Apical Thrill] : no thrill palpable at the apex [S3] : no S3 [S4] : no S4 [Click] : no click [Pericardial Rub] : no pericardial rub [Right Carotid Bruit] : no bruit heard over the right carotid [Left Carotid Bruit] : no bruit heard over the left carotid [Right Femoral Bruit] : no bruit heard over the right femoral artery [Rt] : no varicose veins of the right leg [Left Femoral Bruit] : no bruit heard over the left femoral artery [Lt] : no varicose veins of the left leg

## 2023-03-31 NOTE — ADDENDUM
[FreeTextEntry1] : I spent 60 min face to face time with the patient by from 12:00 to 13:00 on 3/31/23. Thirty minutes were devoted to  patient counseling at outlined above in the End of Visit section. Prior to this visit, I reviewed office pulmonary records.

## 2023-03-31 NOTE — DISCUSSION/SUMMARY
[FreeTextEntry1] : \par Pre-operatuve (procedural) assessment: Endoscopy / Colonoscopy\par \par Mr. Jono Gomez is scheduled for endoscopy and colonoscopy with Dr. Ortiz Johnson on 43/23. On 3/31/21, he exercised 4:13 James protocol (4 METS). Test stopped for SOB; no CP. No arrhythmia or ST-segment depression on ECG. No evidence of infarction or inducible ischemia on SPECT. He is not on anticoagulation or antiplatelet therapy. He dose not require antibiotics for SBE prevention. No further cardiac testing is warranted prior to the intended procedures. Overall BP is improved and currently at goal. Revised Cardiac Risk Index (RCRI) for pre-operative risk is 0 points (class I risk) with 3.9% 30-day risk of death, MI or cardiac arrest IDraffaeleppjean claude 2017).  Non-cardiac risks include central as well as obstructive sleep apnea, h/o asthma, and spinal stenosis with need for NSAID and opioid for pain relief.\par \par \par WEIGHT GOALS:\par \par Category				BMI range - kg/m2	BMI Prime\par Very severely underweight		less than 15		less than 0.60	\par Severely underweight			from 15.0 to 16.0		from 0.60 to 0.64	\par Underweight				from 16.0 to 18.5		from 0.64 to 0.74	\par Normal (healthy weight)			from 18.5 to 25		from 0.74 to 1.0	\par Overweight				from 25 to 30		from 1.0 to 1.2	\par Obese Class I (Moderately obese)		from 30 to 35		from 1.2 to 1.4	\par Obese Class II (Severely obese)		from 35 to 40		from 1.4 to 1.6	\par Obese Class III (Very severely obese)	over 40			over 1.6	\par \par Your current weight is 169 lbs (177 lbs on 5/17/22; 178 lbs on 11/17/21; 170 lbs on 5/12/21). Given current weight and height 5'3", your calculated body mass index (BMI) is 29.9 kg/sqm. Normal BMI is 18.5-25 kg/sqm. Thus current weight is in the overweight category. Abdominal waist circumference is measured at the level of the umbilicus and is thus not a pants waist measurement. Your current abdominal waist circumference is 43" (41" on 10/28/020; 39.5" on 5/23/18). Normal abdominal waist circumference is < 32 inches for women and < 37 inches for men\par \par DIETARY SALT (SODIUM); DASH DIET AND BLOOD PRESSURE:\par To decrease the sodium in your diet: \par · Use fresh vegetables and foods as much as possible.\par · Avoid canned and processed foods. Cured meats such as rodriguez, ham, and sausages are high in salt.\par · Try using different herbs and spices in your cooking instead of salt.\par · In restaurants, avoid foods with sauces, cheese, and cured meats. Ask for low-sodium choices.\par To get more potassium in your diet, eat:\par · Bananas, fresh or dried apricots, peaches, citrus fruits, melons\par · Cauliflower, broccoli, tomatoes, carrots, raw spinach, beet greens, potatoes\par To get more magnesium in your diet, eat:\par · Whole grain foods, leafy green vegetables, dried fruits\par • Fish and seafood, poultry \par To get more calcium in your diet, eat:\par · Nonfat milk, yogurt, and low-fat cheeses \par · [place] and sardines\par · Cooked dried beans\par · Broccoli, kale, and bok filiberto\par · Tofu or soybean curd\par DASH stands for "dietary approaches to stop hypertension." The DASH diet is low in total and saturated fat. It is rich in fruits, vegetables, and low-fat dairy foods. The diet allows you to get natural fiber, calcium, and magnesium from food. It prevents or lowers high blood pressure. It can also help lower cholesterol in your blood. \par Don't change how you eat all at once. It's much more likely that you'll succeed if you make only one or two small changes at a time. Wait until those changes are a habit, then make a couple more changes. Some good starting steps include: \par Add one serving of vegetables to your meals at lunch and dinner. This is an easy way to help you get more vegetables in your diet. \par Have a piece of fruit as an afternoon or after-dinner snack. One glass of juice at breakfast is not enough fruit in your diet. \par Use half your usual amount of butter, margarine, or salad dressing. \par Buy nonfat salad dressing or nonfat sour cream.\par Follow this guide to select your menu of meals. The number of calories we want you to eat each day will tell you how many servings you can choose from each food group.\par Calories: 1600 2100 2600 3100  Servings Grains 6 7 ½ 10 ½ 12 ½  Vegetables 	 4 4 ½ 5 6 Fruit 4 5 5 6 Dairy (low-fat) 2 ½ 3 3 3 ½  Meat, poultry, fish ½ 1 ½ 2 2 ½  Nuts, seeds ½ ½ ½ 1 Fats and sweets 1 ½ 2 ½ 3 4\par Grains and grain products like breads and cereals provide energy, fiber and vitamins. Whole grains have more of these nutrients. One serving equals one of the following:\par Bagel, 1/2 medium; Barley, cooked 1/2 cup; Biscuit, country style 1 medium; Bread, whole wheat, white 1 slice; Cereals, cold or cooked, 1/2 cup; Cornbread, 1 medium piece; Crackers, alfredo, 2; Crackers, saltine, 4; Dinner roll, 1medium; English muffin, ½; Hamburger bun, ½; Muffin, 1 medium; Pancake, 1 medium; Pasta, 1/2 cup cooked; Liv, 1/2 large or 1 small; Popcorn, 1 cup popped; Pretzels, 1 ounce; Rice, white, brown, or wild, 1/2 cup cooked; Tortillas, corn or flour, 1 medium; Waffle, 1 medium; Wheat germ, 1/4 cup; \par Vegetables are rich sources of potassium, magnesium, and fiber. One serving is 1/2 cup of any of the following cooked vegetables:\par Asparagus, Beans (green, yellow), Beets, Broccoli, [place] Sprouts, Carrots, Cauliflower, Fernando, chicory, mustard and turnip (and other) greens, Corn, Kale, Lima beans, Mixed vegetables, Okra, Parsnips, Peas, green, Potatoes (1/2 medium or 1/2 cup mashed), Pumpkin, Rutabaga, Spaghetti or tomato sauce, Spinach, Squash (zucchini or yellow), Stewed tomatoes, Succotash, Sweet potatoes, Turnips, Yam \par Raw vegetables: Carrots,1/2 cup; Celery, 1/2 cup; Lettuce (edwar, loose-leaf, green-leaf), 1 cup; Peppers, 1/2 cup; Spinach, 1 cup; Tomato, 1/2\par Fruits and fruit juices are important sources of potassium and magnesium. Fruits also contain fiber and are low in sodium and fat. One serving equals:\par Any fruit juice, # cup (6 ounces); Canned or frozen fruit, ½ cup (includes applesauce); Dried fruit, ¼ cup; \par Fresh fruit:\par Apple, 1 medium; Apricots, 2 medium; Banana, 1 medium; Berries, 1/2 cup; Melon, 1 wedge, or 1/2 cup; Cherries, 10; Grapefruit, 1/2; Grapes, 15; Kiwi, 1 medium; [place], 1/2 small; Nectarine, 1 medium; Orange, 1 medium; Peach, 1 medium; Pear, 1 medium; Pineapple, 1/2 cup; Plums, 2 medium; Tangerine, 1 large\par Dairy foods provide protein and calcium. Use low-fat or nonfat dairy products to cut down on fat. One serving equals:\par Skim milk, 1 cup (8 ounces); 1% low fat milk, 1 cup (8 ounces); 2% low fat milk, 1 cup (8 ounces) nonfat dry milk powder (1/3 cup); Low-fat cottage cheese, 1 cup (8 ounces); Parmesan cheese, 1 tablespoon; Mozzarella cheese, part skim, 1/4 cup (1 ounce); Low-fat cheddar cheese, 11/2 ounces; Ricotta cheese, part skim milk or nonfat, 1/4 cup (11/2 ounces); Other low fat or nonfat cheeses (11/2 oz.); Low-fat or nonfat yogurt, fruit-flavored or plain, 1 cup (8 ounces)\par Low-fat or nonfat frozen yogurt, 1/2 cup (4 ounces); Note: People who can't digest dairy products can try taking lactase enzyme pills or drops (available at drug and grocery stores) when they eat dairy. There is also milk available with the enzyme already added. Or you can buy lactose-free milk.\par Meat, poultry, and fish are good sources of protein and magnesium. One serving equals:\par Lean meat including beef, veal, or pork, 3 ounces cooked; Skinless, white meat poultry including turkey, chicken, 3 ounces; Fish and shellfish, 3 ounces cooked; Low-fat luncheon meats, 1 ounce; Egg, 1 medium; Tofu, 3 ounces\par Note: Three ounces of cooked meat is about the size of a deck of cards.\par Nuts, seeds, and legumes are rich sources of energy, magnesium, potassium, protein and fiber. Nuts and seeds are also high in fat, so portions should be small.\par Almonds, 1/3 cup; Beans such as kidney, grossman, and navy, 1/2 cup cooked; Chickpeas and lentils, 1/2 cup cooked; Cashews, 1/3 cup; Filberts, 1/3 cup; Mixed nuts, 1/3 cup; Peanut butter, 2 tablespoons; Peanuts, 1/3 cup; Sesame seeds, 2 tablespoons; Sunflower seeds, 2 tablespoons; Tofu, regular, 3 ounces; Walnuts, 1/3 cup \par Following the above diet will give you about 27% fat in your diet. The goal is to have 30% or less of the calories you eat each day be from fat. To meet that goal, do not eat more than 2-3 servings daily of added fat. Also try to limit sweets. One serving equals:\par Butter or margarine, 1 teaspoon; Mayonnaise, 1 teaspoon; Low-fat mayonnaise, 1 tablespoon; Salad dressing, 1 tablespoon; Low-fat salad dressing, 2 tablespoons; Oil, 1 teaspoon (use olive, canola, safflower, or other vegetable oils); Candy, hard, 3 pieces; Jelly or jam, 1 tablespoon); Jell-O, 1/2 cup; Jelly beans, 1/2 ounce; Maple syrup, 1 tablespoon; Popsicle, 1; Sherbet or nonfat or low-fat frozen yogurt, 1/2 cup; Sugar, 1 tablespoon; Sugared lemonade or fruit punch, 1 cup (8 ounces); Note: Try diet fruit-flavored gelatin or frozen, canned, or fresh fruit for dessert.\par \par Small amounts of alcohol may have benefits to the heart and blood pressure. However, excess use of alcohol can cause damage to the brain, liver and other organs. It can lead to high blood pressure. Drinking more than two drinks (15 ml)  every day can raise your blood pressure. 15 ml of alcohol equals: \par • one 12-ounce bottle of beer \par • a half glass (5 ounces) of wine \par • 1 ounce (one shot) of 100 proof hard liquor\par

## 2023-03-31 NOTE — REVIEW OF SYSTEMS
[Weight Loss (___ Lbs)] : [unfilled] ~Ulb weight loss [Heartburn] : heartburn [Blood in stool] : blood in stool [Urinary Frequency] : urinary frequency [Anxiety] : anxiety [Negative] : Neurological [Fever] : no fever [Headache] : no headache [Chills] : no chills [Abdominal Pain] : no abdominal pain [Feeling Fatigued] : not feeling fatigued [Change in Appetite] : no change in appetite [FreeTextEntry4] : see HPI [FreeTextEntry5] : see HPI [FreeTextEntry7] : see HPI [FreeTextEntry6] : see HPI [FreeTextEntry9] : see HPI

## 2023-03-31 NOTE — CARDIOLOGY SUMMARY
[de-identified] : 3/31/23, normal sinus rhythm at 69 bpm, with nonspecific T-abnormality in leads I, aVL, V5-V6. Prior ECG 5/12/21 showed normal sinus rhythm at 81 bpm, with frequent PAC s. # PACs = 2. There was nonspecific T-abnormality.  Rhythm / RR-interval analysis 3/31/23, observed 90 beats over 90 sec with mean HR 68 bpm; mean  ms ([phone]); Max/Min 259%; RR  and RR CV 11%. There was 1 PAC, 2 PVCs [de-identified] : \par  [de-identified] : 3/31/21, exercised 4:13 James protocol. Baseline HR 65 bpm increased to peak 134 bpm (89% MPHR). Baseline /71 mm Hg increased to peak 144/73 mm Hg. Test stopped for SOB; no CP. No arrhythmia or ST-segment depression on ECG. No evidence of infarction or inducible ischemia on SPECT; normal LV wall motion; LVEF >=45%. LV time activity curve suggested diastolic dysfunction., RV function appeared normal [de-identified] : 10/28/2020, mitral annular calcification, otherwise normal mitral valve with mild regurgitation. The aorti root was normal. The aortic vlave was calcified trileaflet, with normal opening. The LA was mildly dilated with volume index 37 ccm². The LV was normal in size and wall thickness. There were no segmental wall motion abnormalities and overall systolic function was normal, with LVEF 64%. There was mild diastolic dysfunction (stage I). The RA was normal. The RV was normal in size and function. The tricuspid and pulmonic valves were normal, with minimal tricuspid regurgitation. The pericardium appeared normal, with no effusion. There were no significant changes compared to prior study 8/12/2016

## 2023-05-26 ENCOUNTER — APPOINTMENT (OUTPATIENT)
Dept: CARDIOLOGY | Facility: CLINIC | Age: 73
End: 2023-05-26

## 2023-09-12 ENCOUNTER — TRANSCRIPTION ENCOUNTER (OUTPATIENT)
Age: 73
End: 2023-09-12

## 2023-09-13 ENCOUNTER — TRANSCRIPTION ENCOUNTER (OUTPATIENT)
Age: 73
End: 2023-09-13

## 2023-09-14 ENCOUNTER — RX RENEWAL (OUTPATIENT)
Age: 73
End: 2023-09-14

## 2023-09-19 ENCOUNTER — APPOINTMENT (OUTPATIENT)
Dept: CARDIOLOGY | Facility: CLINIC | Age: 73
End: 2023-09-19

## 2023-09-20 ENCOUNTER — TRANSCRIPTION ENCOUNTER (OUTPATIENT)
Age: 73
End: 2023-09-20

## 2023-09-25 ENCOUNTER — RX RENEWAL (OUTPATIENT)
Age: 73
End: 2023-09-25

## 2023-10-04 ENCOUNTER — APPOINTMENT (OUTPATIENT)
Dept: CARDIOLOGY | Facility: CLINIC | Age: 73
End: 2023-10-04
Payer: MEDICARE

## 2023-10-04 VITALS — SYSTOLIC BLOOD PRESSURE: 134 MMHG | HEART RATE: 71 BPM | DIASTOLIC BLOOD PRESSURE: 81 MMHG | OXYGEN SATURATION: 95 %

## 2023-10-04 VITALS
OXYGEN SATURATION: 95 % | SYSTOLIC BLOOD PRESSURE: 145 MMHG | HEIGHT: 63 IN | RESPIRATION RATE: 16 BRPM | HEART RATE: 71 BPM | DIASTOLIC BLOOD PRESSURE: 82 MMHG | WEIGHT: 170 LBS | BODY MASS INDEX: 30.12 KG/M2

## 2023-10-04 VITALS — DIASTOLIC BLOOD PRESSURE: 83 MMHG | SYSTOLIC BLOOD PRESSURE: 139 MMHG | HEART RATE: 74 BPM | OXYGEN SATURATION: 96 %

## 2023-10-04 DIAGNOSIS — R60.9 EDEMA, UNSPECIFIED: ICD-10-CM

## 2023-10-04 DIAGNOSIS — E66.3 OVERWEIGHT: ICD-10-CM

## 2023-10-04 DIAGNOSIS — R06.09 OTHER FORMS OF DYSPNEA: ICD-10-CM

## 2023-10-04 DIAGNOSIS — R79.81 ABNORMAL BLOOD-GAS LEVEL: ICD-10-CM

## 2023-10-04 DIAGNOSIS — R53.82 CHRONIC FATIGUE, UNSPECIFIED: ICD-10-CM

## 2023-10-04 DIAGNOSIS — R07.9 CHEST PAIN, UNSPECIFIED: ICD-10-CM

## 2023-10-04 PROCEDURE — 99215 OFFICE O/P EST HI 40 MIN: CPT | Mod: 25

## 2023-10-04 PROCEDURE — 93040 RHYTHM ECG WITH REPORT: CPT | Mod: 59

## 2023-10-04 PROCEDURE — G2212 PROLONG OUTPT/OFFICE VIS: CPT

## 2023-10-04 PROCEDURE — 93000 ELECTROCARDIOGRAM COMPLETE: CPT | Mod: 59

## 2023-10-05 PROBLEM — R53.82 CHRONIC FATIGUE: Status: ACTIVE | Noted: 2019-05-30

## 2023-10-05 PROBLEM — R79.81 BORDERLINE LOW O2 SATURATION: Status: ACTIVE | Noted: 2021-05-12

## 2023-10-05 PROBLEM — E66.3 OVERWEIGHT (BMI 25.0-29.9): Status: ACTIVE | Noted: 2023-03-31

## 2023-10-05 PROBLEM — R06.09 DYSPNEA ON EXERTION: Status: ACTIVE | Noted: 2020-09-23

## 2023-10-05 RX ORDER — MODAFINIL 200 MG/1
200 TABLET ORAL DAILY
Qty: 60 | Refills: 5 | Status: ACTIVE | COMMUNITY
Start: 2019-05-30

## 2023-10-05 RX ORDER — SOLIFENACIN SUCCINATE 5 MG/1
5 TABLET ORAL DAILY
Qty: 90 | Refills: 3 | Status: DISCONTINUED | COMMUNITY
Start: 2023-03-31 | End: 2023-10-05

## 2023-10-05 RX ORDER — TESTOSTERONE CYPIONATE 200 MG/ML
200 INJECTION, SOLUTION INTRAMUSCULAR
Qty: 1 | Refills: 0 | Status: ACTIVE | COMMUNITY
Start: 2023-10-05

## 2023-10-05 RX ORDER — TRAVOPROST 0.04 MG/ML
0 SOLUTION/ DROPS OPHTHALMIC
Qty: 1 | Refills: 3 | Status: ACTIVE | COMMUNITY
Start: 2023-10-05

## 2023-10-05 RX ORDER — SEMAGLUTIDE 0.68 MG/ML
2 INJECTION, SOLUTION SUBCUTANEOUS WEEKLY
Qty: 5 | Refills: 3 | Status: ACTIVE | COMMUNITY
Start: 2023-10-05

## 2023-10-05 RX ORDER — ESOMEPRAZOLE MAGNESIUM 40 MG/1
40 CAPSULE, DELAYED RELEASE ORAL DAILY
Qty: 90 | Refills: 3 | Status: ACTIVE | COMMUNITY
Start: 2023-10-05

## 2023-10-05 RX ORDER — DOCUSATE SODIUM 100 MG/1
100 CAPSULE ORAL TWICE DAILY
Qty: 180 | Refills: 3 | Status: ACTIVE | COMMUNITY
Start: 2023-10-05

## 2023-10-05 RX ORDER — BRIMONIDINE TARTRATE, TIMOLOL MALEATE 2; 5 MG/ML; MG/ML
0.2-0.5 SOLUTION/ DROPS OPHTHALMIC DAILY
Qty: 1 | Refills: 3 | Status: ACTIVE | COMMUNITY
Start: 2023-10-05

## 2023-10-05 RX ORDER — LIDOCAINE 40 MG/G
4 PATCH TOPICAL
Qty: 1 | Refills: 0 | Status: ACTIVE | COMMUNITY
Start: 2023-10-05

## 2023-10-06 RX ORDER — ALIROCUMAB 150 MG/ML
150 INJECTION, SOLUTION SUBCUTANEOUS
Qty: 6 | Refills: 1 | Status: ACTIVE | COMMUNITY
Start: 2022-04-11

## 2023-10-09 ENCOUNTER — TRANSCRIPTION ENCOUNTER (OUTPATIENT)
Age: 73
End: 2023-10-09

## 2023-10-23 ENCOUNTER — TRANSCRIPTION ENCOUNTER (OUTPATIENT)
Age: 73
End: 2023-10-23

## 2023-10-30 ENCOUNTER — TRANSCRIPTION ENCOUNTER (OUTPATIENT)
Age: 73
End: 2023-10-30

## 2023-10-30 RX ORDER — SPIRONOLACTONE 25 MG/1
25 TABLET ORAL DAILY
Qty: 90 | Refills: 3 | Status: ACTIVE | COMMUNITY
Start: 2023-10-04 | End: 1900-01-01

## 2023-10-31 ENCOUNTER — TRANSCRIPTION ENCOUNTER (OUTPATIENT)
Age: 73
End: 2023-10-31

## 2023-11-20 ENCOUNTER — LABORATORY RESULT (OUTPATIENT)
Age: 73
End: 2023-11-20

## 2023-11-27 ENCOUNTER — APPOINTMENT (OUTPATIENT)
Dept: CARDIOLOGY | Facility: CLINIC | Age: 73
End: 2023-11-27
Payer: MEDICARE

## 2023-11-27 VITALS
DIASTOLIC BLOOD PRESSURE: 80 MMHG | RESPIRATION RATE: 16 BRPM | HEIGHT: 62 IN | HEART RATE: 75 BPM | BODY MASS INDEX: 2.95 KG/M2 | OXYGEN SATURATION: 96 % | SYSTOLIC BLOOD PRESSURE: 140 MMHG | WEIGHT: 16 LBS | TEMPERATURE: 94.1 F

## 2023-11-27 DIAGNOSIS — I25.84 ATHEROSCLEROTIC HEART DISEASE OF NATIVE CORONARY ARTERY W/OUT ANGINA PECTORIS: ICD-10-CM

## 2023-11-27 DIAGNOSIS — I25.10 ATHEROSCLEROTIC HEART DISEASE OF NATIVE CORONARY ARTERY W/OUT ANGINA PECTORIS: ICD-10-CM

## 2023-11-27 PROCEDURE — 99214 OFFICE O/P EST MOD 30 MIN: CPT

## 2023-12-11 RX ORDER — AMLODIPINE BESYLATE 10 MG/1
10 TABLET ORAL DAILY
Qty: 90 | Refills: 3 | Status: ACTIVE | COMMUNITY
Start: 2023-10-05 | End: 1900-01-01

## 2023-12-18 ENCOUNTER — TRANSCRIPTION ENCOUNTER (OUTPATIENT)
Age: 73
End: 2023-12-18

## 2024-01-16 ENCOUNTER — TRANSCRIPTION ENCOUNTER (OUTPATIENT)
Age: 74
End: 2024-01-16

## 2024-01-29 ENCOUNTER — RX RENEWAL (OUTPATIENT)
Age: 74
End: 2024-01-29

## 2024-01-29 RX ORDER — TELMISARTAN 80 MG/1
80 TABLET ORAL DAILY
Qty: 90 | Refills: 3 | Status: ACTIVE | COMMUNITY
Start: 2022-11-08 | End: 1900-01-01

## 2024-02-04 ENCOUNTER — RX RENEWAL (OUTPATIENT)
Age: 74
End: 2024-02-04

## 2024-03-28 ENCOUNTER — TRANSCRIPTION ENCOUNTER (OUTPATIENT)
Age: 74
End: 2024-03-28

## 2024-04-02 LAB
ALBUMIN SERPL ELPH-MCNC: 4.8 G/DL
ALP BLD-CCNC: 99 U/L
ALT SERPL-CCNC: 14 U/L
ANION GAP SERPL CALC-SCNC: 16 MMOL/L
AST SERPL-CCNC: 18 U/L
BILIRUB DIRECT SERPL-MCNC: 0.1 MG/DL
BILIRUB INDIRECT SERPL-MCNC: 0.2 MG/DL
BILIRUB SERPL-MCNC: 0.3 MG/DL
BUN SERPL-MCNC: 18 MG/DL
CALCIUM SERPL-MCNC: 10.4 MG/DL
CHLORIDE SERPL-SCNC: 95 MMOL/L
CHOLEST SERPL-MCNC: 159 MG/DL
CO2 SERPL-SCNC: 27 MMOL/L
CREAT SERPL-MCNC: 1.27 MG/DL
EGFR: 60 ML/MIN/1.73M2
GLUCOSE SERPL-MCNC: 90 MG/DL
HDLC SERPL-MCNC: 72 MG/DL
LDLC SERPL CALC-MCNC: 68 MG/DL
LDLC SERPL DIRECT ASSAY-MCNC: 79 MG/DL
NONHDLC SERPL-MCNC: 87 MG/DL
POTASSIUM SERPL-SCNC: 4.5 MMOL/L
PROT SERPL-MCNC: 7.3 G/DL
SODIUM SERPL-SCNC: 137 MMOL/L
TRIGL SERPL-MCNC: 106 MG/DL
TSH SERPL-ACNC: 2.57 UIU/ML
URATE SERPL-MCNC: 6.9 MG/DL

## 2024-04-03 LAB
ESTIMATED AVERAGE GLUCOSE: 105 MG/DL
HBA1C MFR BLD HPLC: 5.3 %

## 2024-04-09 ENCOUNTER — NON-APPOINTMENT (OUTPATIENT)
Age: 74
End: 2024-04-09

## 2024-04-09 ENCOUNTER — APPOINTMENT (OUTPATIENT)
Dept: CARDIOLOGY | Facility: CLINIC | Age: 74
End: 2024-04-09
Payer: MEDICARE

## 2024-04-09 VITALS
BODY MASS INDEX: 27.79 KG/M2 | OXYGEN SATURATION: 99 % | WEIGHT: 151 LBS | DIASTOLIC BLOOD PRESSURE: 78 MMHG | TEMPERATURE: 98.3 F | RESPIRATION RATE: 16 BRPM | HEART RATE: 71 BPM | HEIGHT: 62 IN | SYSTOLIC BLOOD PRESSURE: 122 MMHG

## 2024-04-09 DIAGNOSIS — E78.41 ELEVATED LIPOPROTEIN(A): ICD-10-CM

## 2024-04-09 DIAGNOSIS — I34.0 NONRHEUMATIC MITRAL (VALVE) INSUFFICIENCY: ICD-10-CM

## 2024-04-09 DIAGNOSIS — Z78.9 OTHER SPECIFIED HEALTH STATUS: ICD-10-CM

## 2024-04-09 DIAGNOSIS — I10 ESSENTIAL (PRIMARY) HYPERTENSION: ICD-10-CM

## 2024-04-09 DIAGNOSIS — E78.5 HYPERLIPIDEMIA, UNSPECIFIED: ICD-10-CM

## 2024-04-09 PROCEDURE — G2211 COMPLEX E/M VISIT ADD ON: CPT

## 2024-04-09 PROCEDURE — 93000 ELECTROCARDIOGRAM COMPLETE: CPT

## 2024-04-09 PROCEDURE — 99214 OFFICE O/P EST MOD 30 MIN: CPT

## 2024-04-09 NOTE — DISCUSSION/SUMMARY
[FreeTextEntry1] : Dr. Earl-(PRIOR VISIT and PMH WITH Dr. Earl):  This is a 71 year old male with past medical history significant for coronary artery calcifications, hypertension, hyperlipidemia, statin intolerance, sleep apnea, asthma, spinal stenosis, mild mitral valve regurgitation, elevated lipoprotein a, GERD, and elevated high-sensitivity C-reactive protein, who comes in for lipid follow-up evaluation. He was born in [place] and has no history of rheumatic fever.  He does not drink excessive caffeine or alcohol. His cardiac risk factors include hyperlipidemia, and positive family history of atherosclerosis (father had a stroke). The patient is tolerating Praluent 150 mg subcutaneously every 2 weeks. Lipid profile done March 9, 2023 demonstrated a cholesterol of 166, HDL 67, triglycerides 66, LDL direct 82 mg/dL and non-HDL cholesterol 100 mg/dL with hemoglobin A1c of 5.8. The patient is concerned about his increase in his hemoglobin A1c to a prediabetic state. I recommend he work with a registered dietitian to improve his hyperglycemia, salt intake, hyperlipidemia and achieve weight loss. I have also asked him to increase his aerobic activity to 40 minutes 4 times per week.e.   Coronary artery calcium score done 12- demonstrated  63 units consisting of 1 unit in the left main, 1 unit in the left anterior descending artery, 7 units in left circumflex artery and 54 units in the right coronary artery Blood work done November 16, 2021 demonstrated hemoglobin A1c of 5.7, LDL direct of 129 mg/dL, high-sensitivity C-reactive protein of 4.1, triglycerides 124, cholesterol 218, HDL of 68 mg/dL, non-HDL cholesterol 150 mg/dL.  The patient has been tried on multiple statins including Crestor 5/10 mg daily, Lipitor 10 mg/day, and was unable to tolerate this medication secondary to muscle aches, extreme weakness which was not improved with dose alteration and rechallenging. He also tried pravastatin which was discontinued after 2-3 weeks due to abdominal discomfort and muscle aches.. He tried pravastatin 20 mg in may 2021 but developed worsening muscle aches, pain and weakness of hips, legs, and ankles which affected his balance, and his symptoms resolved after discontinuing pravastatin. He states that the leg pain was worse in the right than the left leg but he had equal weakness in both. He also tried Ezetimibe 10 mg but developed body aches and nausea. Patient states he discontinued statins in may 2021. Labs from 11/2021 showed total cholesterol 218, HDL 68, . Patient states he does not want to try a statin again. His 10-year ASCVD risk score is 22%.  He is instructed to follow-up with cardiologist, Dr. Rooney and his primary care physician.   Thank you for allowing to participate in the care of this patient.  I look forward to working with you again in the future.  Please do not hesitate to call if you have any further questions.

## 2024-04-09 NOTE — ASSESSMENT
[FreeTextEntry1] : This is a 73-year-old male here today for follow up cardiac evaluation. He follows closely with his primary cardiologist Dr. Rooney.   He has a past medical history significant for hypertension, hyperlipidemia, statin intolerance, sleep apnea, asthma, spinal stenosis, mild mitral valve regurgitation, elevated lipoprotein a, and elevated high-sensitivity C-reactive protein, who comes in for lipid follow-up evaluation.  PSH: spinal surgery in 2018.  He was born in [place] and has no history of rheumatic fever.  He does not drink excessive caffeine or alcohol.   Cardiac risk factors: hypertension, hyperlipidemia, family history positive for stroke at 56 years old (father) and hypertension (mother), former smoker for 15 years 1 pack/day (quit 40 years ago)   HPI: He is feeling generally well today and denies chest pain, dizziness, heart palpitations, recent episodes of syncope or falls, SOB, or dyspnea at this time.  Current Medications: Praluent 150 mg/mL for cholesterol management, Amlodipine 10 mg daily, Telmisartan 80 mg daily, Spironolactone 25 mg daily, and Ozempic 0.5 mg daily as directed by his own cardiologist and PCP.   States he is following closely with his PCP for mildly decreased kidney function on latest bloodwork and low sodium/potassium.  He states that he will be changing both cardiologist and lipidologist doctors in order to be closer to home in [facility] as the drive is beginning to get too much for him and his wife. He is going to now be seeing Dr. Tyrell Tenorio of [facility].   He underwent spinal surgery in January 2024 and is feeling much better.    BLOOD PRESSURE: -BP is controlled.   -I have discussed the importance of maintaining good BP control and reviewed the newest guidelines with the patient while re-enforcing dietary sodium restrictions to no more than 2-3 g daily, DASH diet, life style modifications as well as the goal of maintaining ideal body weight with the patient today. I have advised the patient to avoid the use of over-the-counter medications/ supplements especially NSAIDS.   BLOOD WORK: -Lipid panel done April 2024 demonstrated cholesterol 159, triglycerides 106, HDL 72, LDL 68, HDL 87, LDL direct 79. -Lipid panel done November 2023 demonstrated normal lipid profile with LDL 82.  -New blood work was done September 3, 2022 which demonstrated normal lipid profile and direct LDL of 78. -New blood work was done 4/21/22 which demonstrated normal lipid profile. LDL 72.  -PMH: Patient was referred due to his statin intolerance. Previously, he was prescribed atorvastatin 10 mg daily, Crestor, but stopped after two weeks because of nausea. He also tried pravastatin which was discontinued after 2-3 weeks due to abdominal discomfort. He tried pravastatin 20 mg in may 2021 but developed worsening pain and weakness of hips, legs, and ankles which affected his balance and were associated with dysuria, symptoms resolved after discontinuing pravastatin. He states that the leg pain was worse in the right than the left leg but he had equal weakness in both. He also tried Ezetimibe 10 mg but developed body aches and nausea. Patient states he was also tried on rosuvastatin but it also caused muscle aches, tiredness, and weakness.  -Patient states he discontinued statins in may 2021. Labs from Jan 17 showed total cholesterol 240, HDL 79, LDL calc 143. Patient states he does not want to try a statin again. He states that his blood pressure is always elevated at the beginning of the visit, the provider would check it multiple times throughout the visit and by the end it would be normal. He denies chest pain, palpitations, SOB, [place], lightheadedness, syncope.   TESTING/REPORTS: -EKG done 04/09/2024 demonstrated regular sinus rhythm rate 71 bpm with nonspecific ST-T wave changes.   -EKG done 09/14/2022 which demonstrated regular sinus rhythm with nonspecific ST-T wave changes BPM of 69 with 1 PVC.  -EKG done Apr 27, 2022 which demonstrated regular sinus rhythm with nonspecific ST-T wave changes, BPM of 65.  -The patient had a coronary artery calcium score done 12-. This demonstrated total calcium score of 63 units consisting of 1 unit in the left main, 1 unit in the left anterior descending artery, 7 units in left circumflex artery and 54 units in the right coronary artery.    PLAN: -He will continue with his usual medications and will contact the office if he is having any complaints between now and their next follow up appointment. -He will follow up with his new Lipidologist and Cardiologist in [facility].   The patient understands that aerobic exercises must be increased to ~30 minutes 4 times/week and a detailed discussion of lifestyle modification was done today.  The patient has a good understanding of the diagnosis, treatment plan and lifestyle modification.  He will contact me at the office for any questions with their care or any changes in their health status.  Dr Earl, supervising physician, was present in the office with SHASHANK Hope NP during the oliva portions of the history and exam. The plan was discussed in detail as documented in the note.   SHASHANK Hope NP

## 2024-04-09 NOTE — REASON FOR VISIT
[CV Risk Factors and Non-Cardiac Disease] : CV risk factors and non-cardiac disease [Hyperlipidemia] : hyperlipidemia [FreeTextEntry3] : Dr. Jimmy Simpson  [FreeTextEntry1] : This is a 72 year old male here today for follow up lipid evaluation. \par  He has a past medical history significant for hypertension, hyperlipidemia, statin intolerance, sleep apnea, asthma, spinal stenosis, mild mitral valve regurgitation, elevated lipoprotein a, and elevated high-sensitivity C-reactive protein.\par  \par  CHIEF COMPLAINT:\par  Today he is feeling generally well and does not have any complaints at this time.  He is currently prescribed Praluent 150 mg/mL for cholesterol management and is on amlodipine 10 mg daily, telmisartan 80 mg for blood pressure management as directed by his own cardiologist.\par  \par  He denies fever, chills, weight loss, malaise, rash, alteration bowel habits, weakness, abdominal  pain, bloating, changes in urination, visual disturbances, chest pain, headaches, dizziness, heart palpitations, recent episodes of syncope or falls at this time. Today his BP was 144/80, but he states that it is always high initially when he sees his cardiologist but after repeated measurements over 15 minutes it averages to around 130/80.\par  \par  Cardiac risk factors: hypertension, hyperlipidemia, family history positive for stroke at 56 years old (father) and hypertension (mother) and hyperlipidemia (mother), former smoker for 15 years 1 pack/day (quit 40 years ago)\par  PSH: spinal surgery in 2018\par  \par  On 3/9/23, triglycerides were 66, cholesterol 166, HDL 67, LDL 82, non-HDL cholesterol 100, A1c 5.8

## 2024-10-09 ENCOUNTER — APPOINTMENT (OUTPATIENT)
Dept: CARDIOLOGY | Facility: CLINIC | Age: 74
End: 2024-10-09

## 2025-01-11 NOTE — DISCUSSION/SUMMARY
[FreeTextEntry1] : This is a 71 year old male with past medical history significant for hypertension, hyperlipidemia, statin intolerance, sleep apnea, asthma, spinal stenosis, mild mitral valve regurgitation, elevated lipoprotein a, and elevated high-sensitivity C-reactive protein, who comes in for lipid follow-up evaluation.\par He was born in [place] and has no history of rheumatic fever.  He does not drink excessive caffeine or alcohol.\par His cardiac risk factors include hyperlipidemia, and positive family history of atherosclerosis (father had a stroke).\par The patient is clearly statin intolerant.\par I had recommend the patient start Repatha PCSK9 injectable therapy, but his insurance required an abnormal coronary artery calcium score.  \par The patient had a coronary artery calcium score done 12-.  This demonstrated total calcium score of 63 units consisting of 1 unit in the left main, 1 unit in the left anterior descending artery, 7 units in left circumflex artery and 54 units in the right coronary artery.\par \par Unfortunately the insurance company will only cover PCSK9 injectable therapy with a coronary calcium score of 300 or greater.  Given the limitations imposed by his insurance company despite appeal, I will work with him to reduce his LDL cholesterol.  Given the presence of coronary artery disease in the form of coronary artery calcification, his LDL target is less than 70 mg/dL.\par \par He will start Zetia 10 mg daily.  If he is able to tolerate it, I would consider the addition of WelChol and will start paperwork immediately for Nexlezet.  It is possible a foundation may cover the medication for him.\par \par I have also asked him to start reviewing Medicare choices that cover Repatha so when the full comes he can change his Medicare insurance coverage.\par \par Blood work done November 16, 2021 demonstrated hemoglobin A1c of 5.7, LDL direct of 129 mg/dL, high-sensitivity C-reactive protein of 4.1, triglycerides 124, cholesterol 218, HDL of 68 mg/dL, non-HDL cholesterol 150 mg/dL.\par \par The patient has been tried on multiple statins including Crestor 5/10 mg daily, Lipitor 10 mg/day, and was unable to tolerate this medication secondary to muscle aches, extreme weakness which was not improved with dose alteration and rechallenging. He also tried pravastatin which was discontinued after 2-3 weeks due to abdominal discomfort and muscle aches.. He tried pravastatin 20 mg in may 2021 but developed worsening muscle aches, pain and weakness of hips, legs, and ankles which affected his balance, and his symptoms resolved after discontinuing pravastatin. He states that the leg pain was worse in the right than the left leg but he had equal weakness in both. He also tried Ezetimibe 10 mg but developed body aches and nausea.\par Patient states he discontinued statins in may 2021. Labs from 11/2021 showed total cholesterol 218, HDL 68, . Patient states he does not want to try a statin again.\par His 10-year ASCVD risk score is 22%.\par \par He is instructed to follow-up with cardiologist, Dr. Rooney and his primary care physician.\par \par I would also recommend that he make an appointment with our registered dietitian, to review his dietary intake, to achieve further lipid lowering effect, and caloric reduction.\par I would also recommend he increase his aerobic activity to 40 minutes 4 times per week.\par \par Thank you for allowing to participate in the care of this patient.  I look forward to working with you again in the future.  Please do not hesitate to call if you have any further questions.
no